# Patient Record
Sex: MALE | Race: WHITE
[De-identification: names, ages, dates, MRNs, and addresses within clinical notes are randomized per-mention and may not be internally consistent; named-entity substitution may affect disease eponyms.]

---

## 2020-05-13 ENCOUNTER — HOSPITAL ENCOUNTER (INPATIENT)
Dept: HOSPITAL 97 - ER | Age: 85
LOS: 9 days | Discharge: NURSING, REHAB OR LONG-TERM CARE | DRG: 291 | End: 2020-05-22
Attending: FAMILY MEDICINE | Admitting: INTERNAL MEDICINE
Payer: COMMERCIAL

## 2020-05-13 VITALS — BODY MASS INDEX: 36.2 KG/M2

## 2020-05-13 DIAGNOSIS — F17.210: ICD-10-CM

## 2020-05-13 DIAGNOSIS — Z79.82: ICD-10-CM

## 2020-05-13 DIAGNOSIS — Z79.899: ICD-10-CM

## 2020-05-13 DIAGNOSIS — I13.0: Primary | ICD-10-CM

## 2020-05-13 DIAGNOSIS — E11.40: ICD-10-CM

## 2020-05-13 DIAGNOSIS — I50.23: ICD-10-CM

## 2020-05-13 DIAGNOSIS — D63.8: ICD-10-CM

## 2020-05-13 DIAGNOSIS — I89.0: ICD-10-CM

## 2020-05-13 DIAGNOSIS — I35.0: ICD-10-CM

## 2020-05-13 DIAGNOSIS — Z90.49: ICD-10-CM

## 2020-05-13 DIAGNOSIS — E83.51: ICD-10-CM

## 2020-05-13 DIAGNOSIS — L89.303: ICD-10-CM

## 2020-05-13 DIAGNOSIS — I49.3: ICD-10-CM

## 2020-05-13 DIAGNOSIS — Z20.828: ICD-10-CM

## 2020-05-13 DIAGNOSIS — I25.10: ICD-10-CM

## 2020-05-13 DIAGNOSIS — Z86.73: ICD-10-CM

## 2020-05-13 DIAGNOSIS — K21.9: ICD-10-CM

## 2020-05-13 DIAGNOSIS — E11.22: ICD-10-CM

## 2020-05-13 DIAGNOSIS — E66.01: ICD-10-CM

## 2020-05-13 DIAGNOSIS — L89.302: ICD-10-CM

## 2020-05-13 DIAGNOSIS — Z88.5: ICD-10-CM

## 2020-05-13 DIAGNOSIS — N28.9: ICD-10-CM

## 2020-05-13 DIAGNOSIS — N18.4: ICD-10-CM

## 2020-05-13 DIAGNOSIS — N17.0: ICD-10-CM

## 2020-05-13 DIAGNOSIS — E43: ICD-10-CM

## 2020-05-13 DIAGNOSIS — R06.02: ICD-10-CM

## 2020-05-13 DIAGNOSIS — L03.116: ICD-10-CM

## 2020-05-13 DIAGNOSIS — I16.1: ICD-10-CM

## 2020-05-13 DIAGNOSIS — I48.0: ICD-10-CM

## 2020-05-13 DIAGNOSIS — E88.09: ICD-10-CM

## 2020-05-13 LAB
ALBUMIN SERPL BCP-MCNC: 3.2 G/DL (ref 3.4–5)
ALP SERPL-CCNC: 95 U/L (ref 45–117)
ALT SERPL W P-5'-P-CCNC: 14 U/L (ref 12–78)
AST SERPL W P-5'-P-CCNC: 12 U/L (ref 15–37)
BUN BLD-MCNC: 29 MG/DL (ref 7–18)
GLUCOSE SERPLBLD-MCNC: 124 MG/DL (ref 74–106)
HCT VFR BLD CALC: 30 % (ref 39.6–49)
INR BLD: 1.1
LYMPHOCYTES # SPEC AUTO: 0.8 K/UL (ref 0.7–4.9)
MAGNESIUM SERPL-MCNC: 1.9 MG/DL (ref 1.8–2.4)
NT-PROBNP SERPL-MCNC: 9136 PG/ML (ref ?–450)
PMV BLD: 8.5 FL (ref 7.6–11.3)
POTASSIUM SERPL-SCNC: 4.5 MMOL/L (ref 3.5–5.1)
RBC # BLD: 3.43 M/UL (ref 4.33–5.43)
TROPONIN (EMERG DEPT USE ONLY): < 0.02 NG/ML (ref 0–0.04)

## 2020-05-13 PROCEDURE — 85025 COMPLETE CBC W/AUTO DIFF WBC: CPT

## 2020-05-13 PROCEDURE — 80076 HEPATIC FUNCTION PANEL: CPT

## 2020-05-13 PROCEDURE — 97161 PT EVAL LOW COMPLEX 20 MIN: CPT

## 2020-05-13 PROCEDURE — 83036 HEMOGLOBIN GLYCOSYLATED A1C: CPT

## 2020-05-13 PROCEDURE — 84100 ASSAY OF PHOSPHORUS: CPT

## 2020-05-13 PROCEDURE — 36415 COLL VENOUS BLD VENIPUNCTURE: CPT

## 2020-05-13 PROCEDURE — 97530 THERAPEUTIC ACTIVITIES: CPT

## 2020-05-13 PROCEDURE — 76770 US EXAM ABDO BACK WALL COMP: CPT

## 2020-05-13 PROCEDURE — 99251: CPT

## 2020-05-13 PROCEDURE — 84484 ASSAY OF TROPONIN QUANT: CPT

## 2020-05-13 PROCEDURE — 99285 EMERGENCY DEPT VISIT HI MDM: CPT

## 2020-05-13 PROCEDURE — 93005 ELECTROCARDIOGRAM TRACING: CPT

## 2020-05-13 PROCEDURE — 86021 WBC ANTIBODY IDENTIFICATION: CPT

## 2020-05-13 PROCEDURE — 84156 ASSAY OF PROTEIN URINE: CPT

## 2020-05-13 PROCEDURE — 97112 NEUROMUSCULAR REEDUCATION: CPT

## 2020-05-13 PROCEDURE — 96374 THER/PROPH/DIAG INJ IV PUSH: CPT

## 2020-05-13 PROCEDURE — 81003 URINALYSIS AUTO W/O SCOPE: CPT

## 2020-05-13 PROCEDURE — 85652 RBC SED RATE AUTOMATED: CPT

## 2020-05-13 PROCEDURE — 97116 GAIT TRAINING THERAPY: CPT

## 2020-05-13 PROCEDURE — 86334 IMMUNOFIX E-PHORESIS SERUM: CPT

## 2020-05-13 PROCEDURE — 82570 ASSAY OF URINE CREATININE: CPT

## 2020-05-13 PROCEDURE — 86140 C-REACTIVE PROTEIN: CPT

## 2020-05-13 PROCEDURE — 93306 TTE W/DOPPLER COMPLETE: CPT

## 2020-05-13 PROCEDURE — 86038 ANTINUCLEAR ANTIBODIES: CPT

## 2020-05-13 PROCEDURE — 84145 PROCALCITONIN (PCT): CPT

## 2020-05-13 PROCEDURE — 71045 X-RAY EXAM CHEST 1 VIEW: CPT

## 2020-05-13 PROCEDURE — 80048 BASIC METABOLIC PNL TOTAL CA: CPT

## 2020-05-13 PROCEDURE — 93970 EXTREMITY STUDY: CPT

## 2020-05-13 PROCEDURE — 83880 ASSAY OF NATRIURETIC PEPTIDE: CPT

## 2020-05-13 PROCEDURE — 83735 ASSAY OF MAGNESIUM: CPT

## 2020-05-13 PROCEDURE — 86225 DNA ANTIBODY NATIVE: CPT

## 2020-05-13 PROCEDURE — 85610 PROTHROMBIN TIME: CPT

## 2020-05-13 PROCEDURE — 81001 URINALYSIS AUTO W/SCOPE: CPT

## 2020-05-13 PROCEDURE — 80053 COMPREHEN METABOLIC PANEL: CPT

## 2020-05-13 PROCEDURE — 76857 US EXAM PELVIC LIMITED: CPT

## 2020-05-13 RX ADMIN — HYDRALAZINE HYDROCHLORIDE SCH MG: 25 TABLET, FILM COATED ORAL at 19:22

## 2020-05-13 RX ADMIN — HYDRALAZINE HYDROCHLORIDE SCH MG: 25 TABLET, FILM COATED ORAL at 16:45

## 2020-05-13 NOTE — RAD REPORT
EXAM DESCRIPTION:  Starr Single View5/13/2020 8:51 am

 

CLINICAL HISTORY:  Shortness of breath

 

COMPARISON:  none

 

FINDINGS:  Small to moderate left and small right pleural effusions

 

Mild bilateral pulmonary opacities

 

The heart is mildly enlarged

 

IMPRESSION:  CHF

## 2020-05-13 NOTE — RAD REPORT
EXAM DESCRIPTION:  USExtrem Venous W Compress Bil5/13/2020 9:28 am

 

CLINICAL HISTORY:  Bilateral leg swelling

 

COMPARISON:  none

 

FINDINGS:  The common femoral, superficial femoral, popliteal and posterior tibial veins bilaterally 
are compressible and demonstrate augmentation.

 

Doppler demonstrates good flow.

 

IMPRESSION:  No evidence of deep venous thrombosis involving  either lower extremity.

## 2020-05-13 NOTE — EDPHYS
Physician Documentation                                                                           

 [facility]                                                                 

Name: Josh Barber                                                                              

Age: 86 yrs                                                                                       

Sex: Male                                                                                         

: 1933                                                                                   

MRN: [id number]                                                                                   

Arrival Date: 2020                                                                          

Time: 08:18                                                                                       

Account#: [id number]                                                                            

Bed 3                                                                                             

Private MD:                                                                                       

ED Physician Devika Harper                                                                    

HPI:                                                                                              

                                                                                             

09:01 This 86 yrs old  Male presents to ER via EMS with complaints of Leg Swelling.  jr8 

09:01 Patient stated that he had sudden increase in lower extremity swelling. Has had open    jr8 

      wounds to buttock and lower leg that were treated with Abx a couple of weeks ago. Now       

      due to the swelling lower leg wound is getting red and worse again. Also complains of       

      increase in shortness of breath . Severity of symptoms: At their worst the symptoms         

      were moderate. The patient has not experienced similar symptoms in the past. The            

      patient has not recently seen a physician.                                                  

                                                                                                  

Historical:                                                                                       

- Allergies:                                                                                      

08:27 Morphine;                                                                               ss  

- PMHx:                                                                                           

08:27 Atrial Fib; CHF;                                                                        ss  

                                                                                                  

- Social history:: Smoking status: Patient reports the use of cigarette tobacco                   

  products, cigars, "i sit on the balcony all day and smoke cigars", Patient uses.                

                                                                                                  

                                                                                                  

ROS:                                                                                              

09:01 Eyes: Negative for injury, pain, redness, and discharge, ENT: Negative for injury,      jr8 

      pain, and discharge, Neck: Negative for injury, pain, and swelling, Abdomen/GI:             

      Negative for abdominal pain, nausea, vomiting, diarrhea, and constipation, Back:            

      Negative for injury and pain, MS/Extremity: Negative for injury and deformity, Neuro:       

      Negative for headache, weakness, numbness, tingling, and seizure.                           

09:01 Cardiovascular: Positive for edema.                                                         

09:01 Respiratory: Positive for shortness of breath.                                              

09:01 Skin: Positive for open wounds left leg and buttock .                                       

                                                                                                  

Exam:                                                                                             

09:01 Eyes:  Pupils equal round and reactive to light, extra-ocular motions intact.  Lids and jr8 

      lashes normal.  Conjunctiva and sclera are non-icteric and not injected.  Cornea within     

      normal limits.  Periorbital areas with no swelling, redness, or edema. ENT:  Nares          

      patent. No nasal discharge, no septal abnormalities noted.  Tympanic membranes are          

      normal and external auditory canals are clear.  Oropharynx with no redness, swelling,       

      or masses, exudates, or evidence of obstruction, uvula midline.  Mucous membranes           

      moist.                                                                                      

09:01 Respiratory:  Lungs have equal breath sounds bilaterally, clear to auscultation and         

      percussion.  No rales, rhonchi or wheezes noted.  No increased work of breathing, no        

      retractions or nasal flaring. Abdomen/GI:  Soft, non-tender, with normal bowel sounds.      

      No distension or tympany.  No guarding or rebound.  No evidence of tenderness               

      throughout. Back:  No spinal tenderness.  No costovertebral tenderness.  Full range of      

      motion. MS/ Extremity:  Pulses equal, no cyanosis.  Neurovascular intact.  Full, normal     

      range of motion. Neuro:  Awake and alert, GCS 15, oriented to person, place, time, and      

      situation.  Cranial nerves II-XII grossly intact.  Motor strength 5/5 in all                

      extremities.  Sensory grossly intact.  Cerebellar exam normal.  Normal gait.                

09:01 Cardiovascular: Rate: normal, Rhythm: irregularly irregular, Pulses: Pulses are 2+ in       

      right radial artery and left radial artery. Heart sounds: murmur, systolic, grade  3        

      over 6, heard in the aortic area, Edema: 4+ edema to level of left knee, left midcalf,      

      left ankle, left foot, right knee, right midcalf, right ankle and right foot.               

09:01 ECG was reviewed by the Attending Physician.                                                

09:01 Skin: Patient has open wound with eschar noted to left anterior tibia region. Mild          

      surrounding erythema noted.                                                                 

                                                                                                  

Vital Signs:                                                                                      

08:23  / 81; Pulse 83; Resp 19; Temp 98.4(O); Pulse Ox 99% on R/A; Weight 157.85 kg     tw2 

      (R); Height 6 ft. 3 in. (190.50 cm); Pain 0/10;                                             

08:24  / 87; Pulse 112; Resp 19; Pulse Ox 95% on R/A; Weight 127.01 kg; Height 6 ft. 3  ss  

      in. (190.50 cm); Pain 0/10;                                                                 

10:00  / 86; Pulse 80; Resp 17; Pulse Ox 96% on R/A;                                    hb  

11:00  / 91; Pulse 68; Resp 19; Pulse Ox 98% on R/A;                                    hb  

12:00  / 81; Pulse 61; Resp 19; Pulse Ox 96% on R/A;                                    tw2 

12:56  / 91; Pulse 65; Resp 18; Pulse Ox 98% on R/A;                                    tw2 

14:00  / 77; Pulse 69; Resp 17; Pulse Ox 97% on R/A;                                    tw2 

15:01  / 84; Pulse 84; Resp 17; Pulse Ox 98% on R/A;                                    tw2 

16:00  / 85; Pulse 76; Resp 18; Pulse Ox 97% on R/A;                                    tw2 

16:52  / 83; Pulse 66; Resp 17; Pulse Ox 95% on R/A;                                    tw2 

08:24 Body Mass Index 35.00 (127.01 kg, 190.50 cm)                                            ss  

                                                                                                  

MDM:                                                                                              

08:27 Patient medically screened.                                                             jr8 

09:57 Data reviewed: vital signs, nurses notes, lab test result(s), EKG, radiologic studies,  jr8 

      plain films, ultrasound. Data interpreted: Pulse oximetry: on room air is 95 %.             

      Interpretation: normal. Counseling: I had a detailed discussion with the patient and/or     

      guardian regarding: the historical points, exam findings, and any diagnostic results        

      supporting the discharge/admit diagnosis, lab results, radiology results, the need for      

      further work-up and treatment in the hospital. Physician consultation: Prince Aubrey BURGOS was called at 09:58, was contacted at 09:58, regarding admission, to the telemetry       

      unit. consult, patient's condition.                                                         

                                                                                                  

                                                                                             

08:26 Order name: Basic Metabolic Panel; Complete Time: 09:51                                                                                                                              

08:26 Order name: CBC with Diff; Complete Time: 09:35                                                                                                                                      

08:26 Order name: LFT's; Complete Time: :51                                                                                                                                              

08:26 Order name: Magnesium; Complete Time: 09:51                                                                                                                                          

08:26 Order name: NT PRO-BNP; Complete Time: 09:51                                                                                                                                         

08:26 Order name: PT-INR; Complete Time: 09:41                                                                                                                                             

08:26 Order name: Troponin (emerg Dept Use Only); Complete Time: 09:51                                                                                                                     

08:26 Order name: XRAY Chest (1 view); Complete Time: 09:15                                                                                                                                

08:26 Order name: EKG; Complete Time: 08:28                                                                                                                                                

08:27 Order name: US Extremity Venous W Compression [place]; Complete Time: 09:41                 8 

                                                                                             

10:13 Order name: Urine Dipstick--Ancillary (enter results); Complete Time: 10:50             bd  

                                                                                             

08:26 Order name: Cardiac monitoring; Complete Time: 09:14                                                                                                                                 

08:26 Order name: EKG - Nurse/Tech; Complete Time: 09:14                                                                                                                                   

08:26 Order name: IV Saline Lock; Complete Time: 09:15                                                                                                                                     

08:26 Order name: Labs collected and sent; Complete Time: 09:15                                                                                                                            

08:26 Order name: O2 Per Protocol; Complete Time: 09:15                                                                                                                                    

08:26 Order name: O2 Sat Monitoring; Complete Time: 09:15                                                                                                                                  

13:58 Order name: Diet 2 Gm Sodium; Complete Time: 13:59                                      mh5 

                                                                                             

16:33 Order name: Labs - recollect needed: please collect mag; Complete Time: 16:56           bd  

                                                                                                  

EC:01 Rate is 74 beats/min. Rhythm is irregularly irregular, A fib with Couplets. Left axis   jr8 

      deviation noted. QRS interval is normal at 84 msec. QT interval is normal at 455 msec.      

      Q waves are Present. T waves are Normal. No ST changes noted. Clinical impression:          

      Atrial Fibrillation and No evidence of ischemia. Interpreted by me. Reviewed by me.         

                                                                                                  

Administered Medications:                                                                         

09:56 Not Given (Duplicate Order): Lasix 80 mg IVP once                                       tw2 

10:11 Drug: Lasix 60 mg Route: IVP; Site: left antecubital;                                   tw2 

15:02 Follow up: Response: No adverse reaction                                                tw2 

                                                                                                  

                                                                                                  

Disposition:                                                                                      

20 10:00 Hospitalization ordered by Prince Aubrey for Inpatient Admission. Preliminary    

  diagnosis are Acute kidney failure, Acute combined systolic (congestive) and                    

  diastolic (congestive) heart failure, Anasarca, Open wound of lower leg.                        

- Bed requested for Intensive Care Unit.                                                          

- Status is Inpatient Admission.                                                              tw2 

- Condition is Stable.                                                                            

- Problem is new.                                                                                 

- Symptoms have improved.                                                                         

                                                                                                  

                                                                                                  

                                                                                                  

Addendum:                                                                                         

05/15/2020                                                                                        

     18:20 Co-signature as Attending Physician, Devika pitts
a2

                                                                                                  

Signatures:                                                                                       

Dispatcher [employer]                           Amber Don Kimberly, RN                     RN   Rimma Tobin RN                      RN   ss                                                   

Robert Gonzalez PA                        PA   jr8                                                  

Lali Darnell RN                          RN   tw2                                                  

Devika Harper MD MD   ma2                                                  

                                                                                                  

Corrections: (The following items were deleted from the chart)                                    

                                                                                             

11:51 11:36 EC Echo Doppler W/Color Flow+[id number].RAD.BRZ ordered. EDMS                           EDMS

12:19 10:00 Hospitalization Ordered by Prince Aubrey BURGOS for Inpatient Admission. Preliminary kl  

      diagnosis is Acute kidney failure; Acute combined systolic (congestive) and diastolic       

      (congestive) heart failure; Anasarca; Open wound of lower leg. Bed requested for            

      Telemetry/MedSurg (observation). Status is Inpatient Admission. Condition is Stable.        

      Problem is new. Symptoms have improved. 8                                                 

13:13 12:19 2020 10:00 Hospitalization Ordered by Prince Aubrey BURGOS for Inpatient       kl  

      Admission. Preliminary diagnosis is Acute kidney failure; Acute combined systolic           

      (congestive) and diastolic (congestive) heart failure; Anasarca; Open wound of lower        

      leg. Bed requested for Telemetry/MedSurg (observation). Status is Inpatient Admission.      

      Condition is Stable. Problem is new. Symptoms have improved.                              

13:15 13:13 2020 10:00 Hospitalization Ordered by Prince Aubrey BURGOS for Inpatient       jr8 

      Admission. Preliminary diagnosis is Acute kidney failure; Acute combined systolic           

      (congestive) and diastolic (congestive) heart failure; Anasarca; Open wound of lower        

      leg. Bed requested for Telemetry/MedSurg (observation). Status is Inpatient Admission.      

      Condition is Stable. Problem is new. Symptoms have improved.                              

16:52 13:15 2020 10:00 Hospitalization Ordered by Prince Aubrey BURGOS for Inpatient       ss  

      Admission. Preliminary diagnosis is Acute kidney failure; Acute combined systolic           

      (congestive) and diastolic (congestive) heart failure; Anasarca; Open wound of lower        

      leg. Bed requested for Intensive Care Unit. Status is Inpatient Admission. Condition is     

      Stable. Problem is new. Symptoms have improved. 8                                         

17:19 16:52 2020 10:00 Hospitalization Ordered by Prince Aubrey BURGOS for Inpatient       tw2 

      Admission. Preliminary diagnosis is Acute kidney failure; Acute combined systolic           

      (congestive) and diastolic (congestive) heart failure; Anasarca; Open wound of lower        

      leg. Bed requested for Intensive Care Unit. Status is Inpatient Admission. Condition is     

      Stable. Problem is new. Symptoms have improved. ss                                          

                                                                                                  

**************************************************************************************************
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
## 2020-05-13 NOTE — EKG
Test Date:    2020-05-13               Test Time:    08:38:16

Technician:   JORGE                                     

                                                     

MEASUREMENT RESULTS:                                       

Intervals:                                           

Rate:         74                                     

SC:                                                  

QRSD:         84                                     

QT:           410                                    

QTc:          455                                    

Axis:                                                

P:                                                   

SC:                                                  

QRS:          -53                                    

T:            99                                     

                                                     

INTERPRETIVE STATEMENTS:                                       

                                                     

Atrial fibrillation with premature ventricular or aberrantly conducted

complexes

Left axis deviation

Minimal voltage criteria for LVH, may be normal variant

Inferior infarct, age undetermined

Anteroseptal infarct, age undetermined

Abnormal ECG

No previous ECG available for comparison



Electronically Signed On 05-13-20 20:13:13 CDT by Ralf Espinal
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
## 2020-05-14 LAB
ALBUMIN SERPL BCP-MCNC: 2.6 G/DL (ref 3.4–5)
ALP SERPL-CCNC: 78 U/L (ref 45–117)
ALT SERPL W P-5'-P-CCNC: 12 U/L (ref 12–78)
AST SERPL W P-5'-P-CCNC: 10 U/L (ref 15–37)
BUN BLD-MCNC: 28 MG/DL (ref 7–18)
GLUCOSE SERPLBLD-MCNC: 129 MG/DL (ref 74–106)
MAGNESIUM SERPL-MCNC: 1.8 MG/DL (ref 1.8–2.4)
POTASSIUM SERPL-SCNC: 4.5 MMOL/L (ref 3.5–5.1)

## 2020-05-14 RX ADMIN — SUCRALFATE SCH GM: 1 TABLET ORAL at 21:59

## 2020-05-14 RX ADMIN — SUCRALFATE SCH GM: 1 TABLET ORAL at 13:01

## 2020-05-14 RX ADMIN — ISOSORBIDE DINITRATE SCH MG: 20 TABLET ORAL at 21:58

## 2020-05-14 RX ADMIN — ASPIRIN 81 MG SCH MG: 81 TABLET ORAL at 08:08

## 2020-05-14 RX ADMIN — ISOSORBIDE DINITRATE SCH: 20 TABLET ORAL at 12:59

## 2020-05-14 RX ADMIN — Medication SCH ML: at 22:01

## 2020-05-14 RX ADMIN — Medication SCH PKT: at 22:01

## 2020-05-14 RX ADMIN — GABAPENTIN SCH MG: 100 CAPSULE ORAL at 08:08

## 2020-05-14 RX ADMIN — MAGNESIUM OXIDE TAB 400 MG (241.3 MG ELEMENTAL MG) SCH MG: 400 (241.3 MG) TAB at 21:00

## 2020-05-14 RX ADMIN — ALBUMIN (HUMAN) SCH: 5 SOLUTION INTRAVENOUS at 08:10

## 2020-05-14 RX ADMIN — ALBUMIN (HUMAN) SCH MG: 5 SOLUTION INTRAVENOUS at 08:08

## 2020-05-14 RX ADMIN — MAGNESIUM OXIDE TAB 400 MG (241.3 MG ELEMENTAL MG) SCH MG: 400 (241.3 MG) TAB at 08:09

## 2020-05-14 RX ADMIN — MAGNESIUM OXIDE TAB 400 MG (241.3 MG ELEMENTAL MG) SCH MG: 400 (241.3 MG) TAB at 13:02

## 2020-05-14 RX ADMIN — Medication SCH APPL: at 11:38

## 2020-05-14 RX ADMIN — ISOSORBIDE DINITRATE SCH MG: 20 TABLET ORAL at 11:38

## 2020-05-14 RX ADMIN — ALBUMIN (HUMAN) SCH MG: 5 SOLUTION INTRAVENOUS at 16:09

## 2020-05-14 RX ADMIN — TAMSULOSIN HYDROCHLORIDE SCH MG: 0.4 CAPSULE ORAL at 22:00

## 2020-05-14 RX ADMIN — HYDRALAZINE HYDROCHLORIDE SCH MG: 25 TABLET, FILM COATED ORAL at 13:01

## 2020-05-14 RX ADMIN — GABAPENTIN SCH MG: 100 CAPSULE ORAL at 13:01

## 2020-05-14 RX ADMIN — SUCRALFATE SCH GM: 1 TABLET ORAL at 08:08

## 2020-05-14 RX ADMIN — SUCRALFATE SCH GM: 1 TABLET ORAL at 16:11

## 2020-05-14 RX ADMIN — GABAPENTIN SCH MG: 100 CAPSULE ORAL at 21:59

## 2020-05-14 RX ADMIN — HYDRALAZINE HYDROCHLORIDE SCH MG: 25 TABLET, FILM COATED ORAL at 08:08

## 2020-05-14 RX ADMIN — HYDRALAZINE HYDROCHLORIDE SCH MG: 25 TABLET, FILM COATED ORAL at 22:00

## 2020-05-14 RX ADMIN — SENNOSIDES AND DOCUSATE SODIUM SCH TAB: 8.6; 5 TABLET ORAL at 22:00

## 2020-05-14 RX ADMIN — PANTOPRAZOLE SODIUM SCH MG: 40 TABLET, DELAYED RELEASE ORAL at 08:08

## 2020-05-14 NOTE — ECHO
HEIGHT: 6 ft 3 in   WEIGHT: 289 lb 12.8 oz   DATE OF STUDY: 5/14/2020   REFER DR: 
Prince ELI Burgos MD

2-DIMENSIONAL: YES

     M.MODE: YES

 DOPPLER: YES

COLOR FLOW: YES



                    TDS:  NO

PORTABLE: NO

 DEFINITY:  NO

BUBBLE STUDY: NO





DIAGNOSIS:  CONGESTIVE HEART FAILURE



CARDIAC HISTORY:  

CATHERIZATION: YES

SURGERY: NO

PROSTHETIC VALVE: NO

PACEMAKER: NO





MEASUREMENTS (cm)

    DIASTOLIC (NORMALS)                 SYSTOLIC (NORMALS)

IVSd                 1.3 (0.6-1.2)                    LA Diam 4.1 (1.9-4.0)     LVEF       
  44%  

LVIDd               5.6 (3.5-5.7)                        LVIDs      4.4 (2.0-3.5)     %FS  
        22%

LVPWd             1.2 (0.6-1.2)

Ao Diam           3.2 (2.0-3.7)



2 DIMENSIONAL ASSESSMENT:

RIGHT ATRIUM:                   NORMAL

LEFT ATRIUM:       DILATED



RIGHT VENTRICLE:            NORMAL

LEFT VENTRICLE: NORMAL SIZE 



TRICUSPID VALVE:             NORMAL

MITRAL VALVE:     MITRAL ANNULAR CALCIFICATION



PULMONIC VALVE:             NORMAL

AORTIC VALVE:     STENOTIC 



PERICARDIAL EFFUSION: NONE

AORTIC ROOT:      NORMAL





LEFT VENTRICULAR WALL MOTION:     MILD GLOBAL HYPOKINESIS.



DOPPLER/COLOR FLOW:     MILD AORTIC STENOSIS - 1.7 CENTIMETER SQUARED. MILD TRICUSPID 
REGURGITATION - NORMAL RIGHT VENTRICULAR SYSTOLIC PRESSURE.



COMMENTS:      MILD AORTIC STENOSIS 1.7 CENTIMETERS SQUARED. MILD GLOBAL HYPOKINESIS - 
EJECTION FRACTION 44%. MITRAL ANNULAR CALCIFICATION. MILD TRICUSPID REGURGITATION - NORMAL 
RIGHT VENTRICULAR SYSTOLIC PRESSURE.



TECHNOLOGIST:   STIVEN ROLLINS
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
## 2020-05-14 NOTE — P.PN
Subjective


Date of Service: 05/14/20


Chief Complaint: shortness of breath and lower extremity edema


Subjective: Improving, Other (Patient reports symptomatic improvement of lower 

extremity edema and pressure ulcers on the buttocks.  Hypertensive crisis has 

resolved. He will be downgraded to general floor while on telemetry.)





Physical Examination





- Vital Signs


Temperature: 98.3 F


Blood Pressure: 167/70


Pulse: 78


Respirations: 13


Pulse Ox (%): 98





- Physical Exam


General: Alert, In no apparent distress, Cooperative


HEENT: Atraumatic, Normocephalic, EOMI


Neck: Supple


Respiratory: Normal air movement


Cardiovascular: Regular rate/rhythm, Normal S1 S2, Irregular heart rate/rhythm


Gastrointestinal: Normal bowel sounds, Soft and benign, Non-distended


Musculoskeletal: No clubbing, No contractures, No warmth, Swelling, Erythema, 

Tenderness


Integumentary: Skin breakdown, Pressure ulcer


Neurological: Normal speech, Sensation intact, Normal affect





Assessment & Plan





- Problems (Diagnosis)


(1) Hypertensive emergency


Current Visit: Yes   Status: Acute   





(2) Acute decompensated heart failure


Current Visit: Yes   Status: Acute   





(3) CAROLE (acute kidney injury)


Current Visit: Yes   Status: Acute   





(4) Stage II pressure ulcer of buttock


Current Visit: Yes   Status: Acute   





(5) Left leg cellulitis


Current Visit: Yes   Status: Acute   





(6) Atrial fibrillation


Current Visit: Yes   Status: Acute   





(7) Hypertension


Current Visit: Yes   Status: Acute   


Physician Review Additional Text: 





Assessment


Patient is a 86 year old  male wtih morbid obesity, HTN, atrial fibri

llation who presented to the ER with worsening bilateral lower extremity edema 

and wounds and mild shortness of breath. He was foudn to have hypertensive 

emergency with SBP > 195 mmHg.  He required ICU placement for nitroglycerin 

infusion and lasix injections





Hypertensive emergency


Decompensated CHF


Atrial fibrillation


CAROLE


Leg wounds/cellulitis


Stage III pressure ulcer in the buttocks





PLAN


Downgrade to floor while on telemetry


Continue diuresis, monitor I/O daily


Continue Hydralazine, add nitrates 


Follow up 2-D ECHO


Coreg for atrial fibrillation


Will discuss systemic anticoagulation for CVA prevention


Follow up nephrology recommendations for CAROLE. 


Continue wound care for as per Wound nurse instructions


Case discussed during MDR for home health with wound care

## 2020-05-15 LAB
BUN BLD-MCNC: 35 MG/DL (ref 7–18)
GLUCOSE SERPLBLD-MCNC: 104 MG/DL (ref 74–106)
POTASSIUM SERPL-SCNC: 4.1 MMOL/L (ref 3.5–5.1)

## 2020-05-15 RX ADMIN — SUCRALFATE SCH GM: 1 TABLET ORAL at 13:18

## 2020-05-15 RX ADMIN — GABAPENTIN SCH MG: 100 CAPSULE ORAL at 21:14

## 2020-05-15 RX ADMIN — HYDRALAZINE HYDROCHLORIDE SCH MG: 25 TABLET, FILM COATED ORAL at 09:11

## 2020-05-15 RX ADMIN — ALBUMIN (HUMAN) SCH MG: 5 SOLUTION INTRAVENOUS at 16:51

## 2020-05-15 RX ADMIN — ISOSORBIDE DINITRATE SCH MG: 20 TABLET ORAL at 13:19

## 2020-05-15 RX ADMIN — GABAPENTIN SCH MG: 100 CAPSULE ORAL at 09:10

## 2020-05-15 RX ADMIN — Medication SCH ML: at 09:14

## 2020-05-15 RX ADMIN — ISOSORBIDE DINITRATE SCH MG: 20 TABLET ORAL at 09:10

## 2020-05-15 RX ADMIN — TAMSULOSIN HYDROCHLORIDE SCH MG: 0.4 CAPSULE ORAL at 21:14

## 2020-05-15 RX ADMIN — Medication SCH ML: at 21:15

## 2020-05-15 RX ADMIN — CHOLECALCIFEROL CAP 125 MCG (5000 UNIT) SCH UNIT: 125 CAP at 09:10

## 2020-05-15 RX ADMIN — MAGNESIUM OXIDE TAB 400 MG (241.3 MG ELEMENTAL MG) SCH MG: 400 (241.3 MG) TAB at 09:00

## 2020-05-15 RX ADMIN — SUCRALFATE SCH GM: 1 TABLET ORAL at 16:51

## 2020-05-15 RX ADMIN — DABIGATRAN ETEXILATE MESYLATE SCH MG: 150 CAPSULE ORAL at 13:27

## 2020-05-15 RX ADMIN — SENNOSIDES AND DOCUSATE SODIUM SCH TAB: 8.6; 5 TABLET ORAL at 21:14

## 2020-05-15 RX ADMIN — DABIGATRAN ETEXILATE MESYLATE SCH MG: 150 CAPSULE ORAL at 21:14

## 2020-05-15 RX ADMIN — Medication SCH PKT: at 09:12

## 2020-05-15 RX ADMIN — PANTOPRAZOLE SODIUM SCH MG: 40 TABLET, DELAYED RELEASE ORAL at 09:10

## 2020-05-15 RX ADMIN — MAGNESIUM OXIDE TAB 400 MG (241.3 MG ELEMENTAL MG) SCH MG: 400 (241.3 MG) TAB at 21:15

## 2020-05-15 RX ADMIN — HYDRALAZINE HYDROCHLORIDE SCH MG: 25 TABLET, FILM COATED ORAL at 21:14

## 2020-05-15 RX ADMIN — LOSARTAN POTASSIUM SCH: 50 TABLET, FILM COATED ORAL at 22:00

## 2020-05-15 RX ADMIN — ASPIRIN 81 MG SCH MG: 81 TABLET ORAL at 09:10

## 2020-05-15 RX ADMIN — ALBUMIN (HUMAN) SCH MG: 5 SOLUTION INTRAVENOUS at 09:11

## 2020-05-15 RX ADMIN — HYDRALAZINE HYDROCHLORIDE SCH MG: 25 TABLET, FILM COATED ORAL at 13:18

## 2020-05-15 RX ADMIN — MAGNESIUM OXIDE TAB 400 MG (241.3 MG ELEMENTAL MG) SCH MG: 400 (241.3 MG) TAB at 13:19

## 2020-05-15 RX ADMIN — Medication SCH APPL: at 14:14

## 2020-05-15 RX ADMIN — SUCRALFATE SCH GM: 1 TABLET ORAL at 21:14

## 2020-05-15 RX ADMIN — GABAPENTIN SCH MG: 100 CAPSULE ORAL at 13:19

## 2020-05-15 RX ADMIN — ISOSORBIDE DINITRATE SCH MG: 20 TABLET ORAL at 21:14

## 2020-05-15 RX ADMIN — HYDRALAZINE HYDROCHLORIDE SCH: 25 TABLET, FILM COATED ORAL at 09:00

## 2020-05-15 RX ADMIN — Medication SCH PKT: at 21:15

## 2020-05-15 RX ADMIN — SUCRALFATE SCH GM: 1 TABLET ORAL at 09:11
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
## 2020-05-15 NOTE — P.PN
Subjective


Date of Service: 05/15/20


Chief Complaint: shortness of breath and lower extremity edema


Subjective: No new changes (Patient is reporting dyspnea with minimal exertion 

such as getting out of toilet.  However, he is overall doing better.  I spent 10

minutes discussing plan of care. I stressed on the importance of dietary 

compliance including daily total water intake and salt, medication compliance.  

I also discussed the importance of systemic anticoagulation for CVA prevention. 

He was initially adverse to the idea because of the amount of monitoring 

required while on warfarin.  I discussed alternative with him.  He is agreeable 

as long as there is reversal agent.)





Physical Examination





- Vital Signs


Temperature: 97.7 F


Blood Pressure: 136/63


Pulse: 58


Respirations: 19


Pulse Ox (%): 94





- Physical Exam


General: Cooperative, Moderate distress


HEENT: Atraumatic, Normocephalic, EOMI


Respiratory: Diminished, Crackles/rales


Cardiovascular: Normal S1 S2, Irregular heart rate/rhythm


Gastrointestinal: Normal bowel sounds, Soft and benign, Non-distended


Musculoskeletal: Swelling, Erythema, Tenderness


Integumentary: Pressure ulcer, Other (stage III pressure ulcer in the buttocks)


Neurological: Normal speech, Sensation intact, Normal affect





Assessment & Plan





- Problems (Diagnosis)


(1) Hypertensive emergency


Current Visit: Yes   Status: Acute   





(2) Acute decompensated heart failure


Current Visit: Yes   Status: Acute   





(3) CAROLE (acute kidney injury)


Current Visit: Yes   Status: Acute   





(4) Stage II pressure ulcer of buttock


Current Visit: Yes   Status: Acute   





(5) Left leg cellulitis


Current Visit: Yes   Status: Acute   





(6) Atrial fibrillation


Current Visit: Yes   Status: Acute   





(7) Hypertension


Current Visit: Yes   Status: Acute   


Physician Review Additional Text: 





Assessment


Patient is a 86 year old  male with morbid obesity, HTN, atrial 

fibrillation who presented to the ER with worsening bilateral lower extremity 

edema and wounds and mild shortness of breath. He was found to have hypertensive

emergency with SBP > 195 mmHg, acute on chronic systolic HF and CAROLE.  He 

required ICU placement for nitroglycerin infusion and lasix injections.  2-D 

Echo shows EF of 40-44%.  He is still requiring diuresis. His renal function is 

slowly to recover. He had a negative renal and bladder ultrasound. Nephrology is

on board. 





Hypertensive emergency


Decompensated CHF


Atrial fibrillation


CAROLE


Leg wounds/cellulitis


Stage III pressure ulcer in the buttocks





PLAN


Continue telemetry


Continue diuresis, monitor I/O daily. Received 1 dose of metolazone yesterday


Patient educated on fluid restriction, 1.5 L per day AND salt restriction


Continue Hydralazine and nitrates 


Coreg for atrial fibrillation 


Continue wound care for as per Wound nurse instructions


Case discussed during MDR for home health with wound care. I will put order for 

cardiac rehab.

## 2020-05-15 NOTE — P.PN
Date of Service: 05/15/20


Vital Signs











Temp Pulse Resp BP Pulse Ox


 


 98.1 F   53   18   172/65 H  97 


 


 05/15/20 20:00  05/15/20 20:00  05/15/20 20:00  05/15/20 20:00  05/15/20 20:00





Medications





Aspirin (Aspirin Chewable)  81 mg PO DAILY [facility]


   Stop: 06/13/20 09:01


   Last Admin: 05/15/20 09:10 Dose:  81 mg


   Documented by: 


Carvedilol (Coreg)  12.5 mg PO BID 6AM 6PM [facility]


   Stop: 06/13/20 10:57


   Last Admin: 05/15/20 17:04 Dose:  12.5 mg


   Documented by: 


Cholecalciferol (Vitamin D 5,000 Iu Cap)  5,000 unit PO DAILY [facility]


   Stop: 06/14/20 09:01


   Last Admin: 05/15/20 09:10 Dose:  5,000 unit


   Documented by: 


Dabigatran (Pradaxa)  150 mg PO BID [facility]


   Stop: 06/14/20 14:01


   Last Admin: 05/15/20 21:14 Dose:  150 mg


   Documented by: 


Emollient Gel (Medihoney Woundcare Gel)  1 appl TOP DAILY [facility]


   Stop: 06/13/20 09:01


   Last Admin: 05/15/20 14:14 Dose:  1 appl


   Documented by: 


Furosemide (Lasix)  40 mg IV BIDL [facility]


   Stop: 06/13/20 07:37


   Last Admin: 05/15/20 16:51 Dose:  40 mg


   Documented by: 


Gabapentin (Neurontin)  100 mg PO TID [facility]


   Stop: 06/13/20 09:01


   Last Admin: 05/15/20 21:14 Dose:  100 mg


   Documented by: 


Hydralazine HCl (Apresoline)  50 mg PO TID [facility]


   Stop: 06/14/20 08:01


   Last Admin: 05/15/20 21:14 Dose:  50 mg


   Documented by: 


Nitroglycerin/Dextrose (Ntg 0.2 Mg/Ml In D5w 250 Ml)  50 mg in 250 mls @ 0 

mls/hr IV PRN PRN; Protocol


   PRN Reason: Hemodynamic Parameters


   Stop: 06/12/20 17:26


   Last Admin: 05/13/20 17:53 Dose:  250 mls


   Documented by: 


Isosorbide Dinitrate (Isordil)  20 mg PO TID [facility]


   Stop: 06/13/20 10:55


   Last Admin: 05/15/20 21:14 Dose:  20 mg


   Documented by: 


L-Arginine/L-Glutamine/HMB (Carl)  1 pkt PO BID [facility]


   Stop: 06/13/20 21:01


   Last Admin: 05/15/20 21:15 Dose:  1 pkt


   Documented by: 


Magnesium Oxide (Mag 0x Tab)  400 mg PO TID [facility]


   Stop: 06/13/20 09:01


   Last Admin: 05/15/20 21:15 Dose:  400 mg


   Documented by: 


Nutritional Formula (Ensure High Protein)  237 ml PO BID [facility]


   Stop: 06/13/20 21:01


   Last Admin: 05/15/20 21:15 Dose:  237 ml


   Documented by: 


Pantoprazole Sodium (Protonix  Tab)  40 mg PO DAILY [facility]


   Stop: 06/13/20 09:01


   Last Admin: 05/15/20 09:10 Dose:  40 mg


   Documented by: 


Senna/Docusate Sodium (Senokot-S)  1 tab PO BEDTIME [facility]


   Stop: 06/13/20 21:01


   Last Admin: 05/15/20 21:14 Dose:  1 tab


   Documented by: 


Sucralfate (Carafate -Tab)  1 gm PO QID [facility]


   Stop: 06/13/20 09:01


   Last Admin: 05/15/20 21:14 Dose:  1 gm


   Documented by: 


Tamsulosin HCl (Flomax)  0.8 mg PO BEDTIME [facility]


   Stop: 06/13/20 21:01


   Last Admin: 05/15/20 21:14 Dose:  0.8 mg


   Documented by: 





Assessment/ Plan: 


Nephrology





Feeling better today.


CPS stable without CP or SOB.  +TORRES


No acute events overnight.





Vitals, medications, blood work and imaging reviewed in the chart.


General: In no apparent distress, Oriented x3, Cooperative


HEENT: Normocephalic, Mucous membr. moist/pink


Neck: Supple


Respiratory: Clear to auscultation bilaterally


Cardiovascular: Regular rate/rhythm, No rubs, Edema


Gastrointestinal: Soft and benign, Non-distended, No guarding


Musculoskeletal: No clubbing, No contractures


Integumentary: No cyanosis, Skin breakdown


Neurological: Normal speech





Laboratory Data (last 24 hrs)


05/13/20 09:10: PT 13.0 H, INR 1.10


05/13/20 09:10: WBC 5.5, Hgb 9.5 L, Hct 30.0 L, Plt Count 243


05/13/20 09:10: Sodium 138, Potassium 4.5, BUN 29 H, Creatinine 2.39 H, Glucose 

124 H, Magnesium 1.9, Total Bilirubin 0.5, AST 12 L, ALT 14, Alkaline Phosphata

se 95





Imagings Data: 


EXAM DESCRIPTION:  Starr Single View5/13/2020 8:51 am


CLINICAL HISTORY:  Shortness of breath


COMPARISON:  none


FINDINGS:  Small to moderate left and small right pleural effusion


Mild bilateral pulmonary opacities


The heart is mildly enlarged


IMPRESSION:  CHF





COMMENTS:      MILD AORTIC STENOSIS 1.7 CENTIMETERS SQUARED. MILD GLOBAL 

HYPOKINESIS  EJECTION FRACTION 44%. MITRAL ANNULAR CALCIFICATION. MILD TRICUSPID

REGURGITATION  NORMAL RIGHT VENTRICULAR SYSTOLIC PRESSURE.





Conclusions/Impression: 


A/


CAROLE in the setting of CHF.


Hypocalcemia.


CKD with proteinuria.  Baseline unknown.


HTN with CKD/ CHF.


Systolic CHF, A/C.


DM II with CKD.


Anemia in chronic illness.





P/ Continue current POC and Medications.


Continue Lasix.


Reduce Coreg due to bradycardia.


Restart Losartan 25mg BID and titrate as needed.


No NSAIDs.


AM labs.  Daily weight.


PCP records pending.

## 2020-05-15 NOTE — RAD REPORT
EXAM DESCRIPTION:  US - Urinary Bladder - 5/14/2020 9:48 pm

 

FINDINGS:  Dedicated bladder sonography was performed.

 

Urinary bladder volume is low somewhat limiting assessment. No bladder wall thickening or mass identi
fied. No stone or other intraluminal mass identifiable.

## 2020-05-15 NOTE — RAD REPORT
EXAM DESCRIPTION:  US - Renal Ultrasound-Complete - 5/14/2020 9:48 pm

 

CLINICAL HISTORY:  CAROLE

 

COMPARISON:  No comparisons

 

FINDINGS:  The right kidney measures 11.8 x 5.6 x 6.2 cm.  The left kidney measures 12.9 x 5.5 x 5.0 
cm. Renal cortical thickness and echogenicity are normal. No hydronephrosis or suspicious renal mass.


 

Doppler evaluation was technically difficult. There appears to be a diminished perfusion to both kidn
eys. The accuracy of diminished perfusion assessment is question. If warranted, dedicated renal Doppl
er assessment could be performed with the patient could undergo no CTA or MRA of the renal vasculatur
e.

 

No bladder wall thickening or mass. No intraluminal stone or mass.

 

 

IMPRESSION:  No hydronephrosis or suspicious renal mass.

 

Limited Doppler assessment suggest diminished perfusion a each kidneys. If clinically warranted, this
 could be further assessed as detailed above.

## 2020-05-16 LAB
BUN BLD-MCNC: 48 MG/DL (ref 7–18)
GLUCOSE SERPLBLD-MCNC: 136 MG/DL (ref 74–106)
HCT VFR BLD CALC: 26 % (ref 39.6–49)
LYMPHOCYTES # SPEC AUTO: 0.8 K/UL (ref 0.7–4.9)
PMV BLD: 8.9 FL (ref 7.6–11.3)
POTASSIUM SERPL-SCNC: 4.1 MMOL/L (ref 3.5–5.1)
RBC # BLD: 2.97 M/UL (ref 4.33–5.43)

## 2020-05-16 RX ADMIN — Medication SCH ML: at 20:58

## 2020-05-16 RX ADMIN — HYDRALAZINE HYDROCHLORIDE SCH MG: 25 TABLET, FILM COATED ORAL at 20:58

## 2020-05-16 RX ADMIN — Medication SCH PKT: at 20:58

## 2020-05-16 RX ADMIN — SENNOSIDES AND DOCUSATE SODIUM SCH TAB: 8.6; 5 TABLET ORAL at 20:58

## 2020-05-16 RX ADMIN — ISOSORBIDE DINITRATE SCH MG: 20 TABLET ORAL at 20:58

## 2020-05-16 RX ADMIN — MAGNESIUM OXIDE TAB 400 MG (241.3 MG ELEMENTAL MG) SCH MG: 400 (241.3 MG) TAB at 20:58

## 2020-05-16 RX ADMIN — Medication SCH PKT: at 09:06

## 2020-05-16 RX ADMIN — CHOLECALCIFEROL CAP 125 MCG (5000 UNIT) SCH UNIT: 125 CAP at 09:01

## 2020-05-16 RX ADMIN — HYDRALAZINE HYDROCHLORIDE SCH MG: 25 TABLET, FILM COATED ORAL at 09:01

## 2020-05-16 RX ADMIN — LOSARTAN POTASSIUM SCH MG: 50 TABLET, FILM COATED ORAL at 09:01

## 2020-05-16 RX ADMIN — ISOSORBIDE DINITRATE SCH MG: 20 TABLET ORAL at 09:02

## 2020-05-16 RX ADMIN — SUCRALFATE SCH GM: 1 TABLET ORAL at 20:58

## 2020-05-16 RX ADMIN — HYDRALAZINE HYDROCHLORIDE SCH MG: 25 TABLET, FILM COATED ORAL at 13:58

## 2020-05-16 RX ADMIN — SUCRALFATE SCH GM: 1 TABLET ORAL at 17:01

## 2020-05-16 RX ADMIN — MAGNESIUM OXIDE TAB 400 MG (241.3 MG ELEMENTAL MG) SCH MG: 400 (241.3 MG) TAB at 14:22

## 2020-05-16 RX ADMIN — SUCRALFATE SCH GM: 1 TABLET ORAL at 13:58

## 2020-05-16 RX ADMIN — DABIGATRAN ETEXILATE MESYLATE SCH MG: 75 CAPSULE ORAL at 09:06

## 2020-05-16 RX ADMIN — SUCRALFATE SCH GM: 1 TABLET ORAL at 09:01

## 2020-05-16 RX ADMIN — ASPIRIN 81 MG SCH: 81 TABLET ORAL at 09:00

## 2020-05-16 RX ADMIN — ALBUMIN (HUMAN) SCH MG: 5 SOLUTION INTRAVENOUS at 09:02

## 2020-05-16 RX ADMIN — GABAPENTIN SCH MG: 100 CAPSULE ORAL at 13:58

## 2020-05-16 RX ADMIN — ACETAMINOPHEN PRN MG: 500 TABLET, FILM COATED ORAL at 17:01

## 2020-05-16 RX ADMIN — ISOSORBIDE DINITRATE SCH MG: 20 TABLET ORAL at 13:58

## 2020-05-16 RX ADMIN — GABAPENTIN SCH MG: 100 CAPSULE ORAL at 09:02

## 2020-05-16 RX ADMIN — Medication SCH ML: at 09:06

## 2020-05-16 RX ADMIN — DABIGATRAN ETEXILATE MESYLATE SCH MG: 75 CAPSULE ORAL at 20:59

## 2020-05-16 RX ADMIN — GABAPENTIN SCH MG: 100 CAPSULE ORAL at 20:58

## 2020-05-16 RX ADMIN — ALBUMIN (HUMAN) SCH MG: 5 SOLUTION INTRAVENOUS at 17:00

## 2020-05-16 RX ADMIN — Medication SCH APPL: at 09:06

## 2020-05-16 RX ADMIN — MAGNESIUM OXIDE TAB 400 MG (241.3 MG ELEMENTAL MG) SCH MG: 400 (241.3 MG) TAB at 09:02

## 2020-05-16 RX ADMIN — PANTOPRAZOLE SODIUM SCH MG: 40 TABLET, DELAYED RELEASE ORAL at 09:02

## 2020-05-16 RX ADMIN — TAMSULOSIN HYDROCHLORIDE SCH MG: 0.4 CAPSULE ORAL at 20:59

## 2020-05-16 NOTE — PN
Date of Progress Note:  05/16/2020



Mr. Barber is there with acute on chronic systolic congestive heart failure, acute kidney injury.  E
jection fraction was 44% by echocardiogram.  Mr. Barber has paroxysmal atrial fibrillation.  He has 
had that in the past and has refused Coumadin therapy, but yesterday, he was convinced to take Pradax
a for his atrial fibrillation.  His atrial fibrillation is well controlled.  His heart rate is only 5
9.  His last blood pressure was 137/48.  He is saturating 100% on room air.  His last creatinine of 2
.46.  He has been seen by Nephrology.  Renal ultrasound suggested diminished perfusion of each kidney
, but no hydronephrosis or renal masses.  His latest recommendation from Nephrology was to continue h
is Lasix, Coreg was reduced secondary to bradycardia, losartan was restarted, a.m. labs were ordered.
  I agree with his present assessment and plan and treatment.  I will continue to follow him.





NB/SIDNEY

DD:  05/16/2020 17:22:51Voice ID:  [id number]

DT:  05/16/2020 20:57:57Report ID:  [id number]

## 2020-05-16 NOTE — P.PN
Date of Service: 05/16/20





patient seen/examined.


labs/meds reviewed.


patient still weak and bp on lower side at times.


creatinine slightly higher and now in 3 range. 


calcium is lower.


on multiple bp meds.





vs stable. low bp at times.





lungs decreased bs b/l


cvs irregular


abd soft/nt/nd/bs+


ext: impressive 2-3 + edema b/l





a/p:


asher?/CKD? stage 3/? cardiorenal/? 2nd to hx of htn/on multiple meds with bp 

lower at times/had been on betablocker and hydralazine in past but betablocker 

was d/neo while hydralazine continued. on higher dose of flomax. feels light 

headed and dizzy easily.





decrease losartan to 25mg daily


decrease gabapentin to 100mg qhs


decrease hydralazine to 25mg tid and hold for sbp <120


decrease flomax to 0.4 mg and hold for sbp <120


if bp improves, may consider increasing lasix in future and/or increasing 

losartan. if stabilizes on diuretics and ace/arb, may be able to cut back on 

hydralazine further and consider stopping. my stop gabapentin in future if able 

to tolerate stopping it.


likely contribution to swelling from gabapentin and hydralazine.


agree with betablocker. would avoid using hydralazine if betablocker is 

discontinued for any reason.





d/w hospitalist.

## 2020-05-16 NOTE — P.PN
Subjective


Date of Service: 05/16/20


Chief Complaint: shortness of breath and lower extremity edema


Subjective: New changes (Patient bradycardic this morning.  Coreg held.  

Otherwise, he is comfortable w/o supplemental O2 while at rest.  Still reporting

TORRES.)





Physical Examination





- Vital Signs


Temperature: 97.3 F


Blood Pressure: 133/65


Pulse: 54


Respirations: 20


Pulse Ox (%): 91





- Physical Exam


General: Alert, In no apparent distress, Cooperative, Obese, Other (LETHARGIC)


HEENT: Atraumatic, Normocephalic, EOMI


Respiratory: Diminished (No wheezing or crackles appreciated)


Cardiovascular: Normal S1 S2, Edema, Irregular heart rate/rhythm


Musculoskeletal: Swelling, Erythema, Tenderness, Other (bilateral lower 

extremity edema.  LLE wrapped.  )


Neurological: Normal speech, Sensation intact, Normal affect





Assessment & Plan





- Problems (Diagnosis)


(1) Hypertensive emergency


Current Visit: Yes   Status: Acute   





(2) Acute decompensated heart failure


Current Visit: Yes   Status: Acute   





(3) CAROLE (acute kidney injury)


Current Visit: Yes   Status: Acute   





(4) Stage II pressure ulcer of buttock


Current Visit: Yes   Status: Acute   





(5) Left leg cellulitis


Current Visit: Yes   Status: Acute   





(6) Atrial fibrillation


Current Visit: Yes   Status: Acute   





(7) Hypertension


Current Visit: Yes   Status: Acute   


Physician Review Additional Text: 





Assessment


Patient is a 86 year old  male with morbid obesity, HTN, atrial 

fibrillation who presented to the ER with worsening bilateral lower extremity 

edema and wounds and mild shortness of breath. He was found to have hypertensive

emergency with SBP > 195 mmHg, acute on chronic systolic HF and CAROLE.  He 

required ICU placement for nitroglycerin infusion and lasix injections.  2-D 

Echo shows EF of 40-44%.  He is still requiring diuresis. His renal function is 

slowly to recover. He had a negative renal and bladder ultrasound. Nephrology is

on board. 





Hypertensive emergency


Decompensated CHF


Atrial fibrillation


CAROLE


Leg wounds/cellulitis


Stage III pressure ulcer in the buttocks





PLAN


HOLD coreg due to bradycardia


Continue telemetry


Continue diuresis, monitor I/O daily, fluid restriction ( 1.5 L per day) AND 

salt restriction


Continue Hydralazine and nitrates 


Pradaxa for CVA prevention


Will defer management of CAROLE to Nephrology


Continue wound care for as per Wound nurse instructions


Patient will need to go to rehab.

## 2020-05-17 LAB
BUN BLD-MCNC: 59 MG/DL (ref 7–18)
GLUCOSE SERPLBLD-MCNC: 112 MG/DL (ref 74–106)
HCT VFR BLD CALC: 25 % (ref 39.6–49)
LYMPHOCYTES # SPEC AUTO: 0.8 K/UL (ref 0.7–4.9)
MAGNESIUM SERPL-MCNC: 2.2 MG/DL (ref 1.8–2.4)
PMV BLD: 8.9 FL (ref 7.6–11.3)
POTASSIUM SERPL-SCNC: 4.2 MMOL/L (ref 3.5–5.1)
RBC # BLD: 2.88 M/UL (ref 4.33–5.43)

## 2020-05-17 RX ADMIN — MAGNESIUM OXIDE TAB 400 MG (241.3 MG ELEMENTAL MG) SCH MG: 400 (241.3 MG) TAB at 13:00

## 2020-05-17 RX ADMIN — ALBUMIN (HUMAN) SCH MG: 5 SOLUTION INTRAVENOUS at 09:45

## 2020-05-17 RX ADMIN — GABAPENTIN SCH MG: 100 CAPSULE ORAL at 20:59

## 2020-05-17 RX ADMIN — HYDRALAZINE HYDROCHLORIDE SCH MG: 25 TABLET, FILM COATED ORAL at 21:00

## 2020-05-17 RX ADMIN — DABIGATRAN ETEXILATE MESYLATE SCH MG: 75 CAPSULE ORAL at 09:49

## 2020-05-17 RX ADMIN — DABIGATRAN ETEXILATE MESYLATE SCH MG: 75 CAPSULE ORAL at 20:58

## 2020-05-17 RX ADMIN — SUCRALFATE SCH GM: 1 TABLET ORAL at 20:59

## 2020-05-17 RX ADMIN — HYDRALAZINE HYDROCHLORIDE SCH MG: 25 TABLET, FILM COATED ORAL at 09:49

## 2020-05-17 RX ADMIN — MAGNESIUM OXIDE TAB 400 MG (241.3 MG ELEMENTAL MG) SCH MG: 400 (241.3 MG) TAB at 09:48

## 2020-05-17 RX ADMIN — CHOLECALCIFEROL CAP 125 MCG (5000 UNIT) SCH UNIT: 125 CAP at 09:48

## 2020-05-17 RX ADMIN — SUCRALFATE SCH GM: 1 TABLET ORAL at 13:00

## 2020-05-17 RX ADMIN — Medication SCH PKT: at 09:51

## 2020-05-17 RX ADMIN — ISOSORBIDE DINITRATE SCH MG: 20 TABLET ORAL at 13:01

## 2020-05-17 RX ADMIN — PANTOPRAZOLE SODIUM SCH MG: 40 TABLET, DELAYED RELEASE ORAL at 09:49

## 2020-05-17 RX ADMIN — HYDRALAZINE HYDROCHLORIDE SCH MG: 25 TABLET, FILM COATED ORAL at 13:00

## 2020-05-17 RX ADMIN — Medication SCH ML: at 21:00

## 2020-05-17 RX ADMIN — ISOSORBIDE DINITRATE SCH MG: 20 TABLET ORAL at 21:00

## 2020-05-17 RX ADMIN — TAMSULOSIN HYDROCHLORIDE SCH MG: 0.4 CAPSULE ORAL at 20:59

## 2020-05-17 RX ADMIN — LOSARTAN POTASSIUM SCH MG: 50 TABLET, FILM COATED ORAL at 09:48

## 2020-05-17 RX ADMIN — MAGNESIUM OXIDE TAB 400 MG (241.3 MG ELEMENTAL MG) SCH MG: 400 (241.3 MG) TAB at 21:00

## 2020-05-17 RX ADMIN — Medication SCH APPL: at 09:51

## 2020-05-17 RX ADMIN — Medication SCH PKT: at 21:00

## 2020-05-17 RX ADMIN — ISOSORBIDE DINITRATE SCH MG: 20 TABLET ORAL at 09:49

## 2020-05-17 RX ADMIN — ALBUMIN (HUMAN) SCH MG: 5 SOLUTION INTRAVENOUS at 16:30

## 2020-05-17 RX ADMIN — SENNOSIDES AND DOCUSATE SODIUM SCH TAB: 8.6; 5 TABLET ORAL at 21:00

## 2020-05-17 RX ADMIN — SUCRALFATE SCH GM: 1 TABLET ORAL at 09:48

## 2020-05-17 RX ADMIN — Medication SCH ML: at 09:51

## 2020-05-17 RX ADMIN — SUCRALFATE SCH GM: 1 TABLET ORAL at 16:30

## 2020-05-17 NOTE — PN
Date of Progress Note:  05/17/2020



Mr. Barber has been followed for acute on chronic congestive heart failure that is systolic, ejectio
n fraction 44%.  Has elevated creatinine; paroxysmal atrial fibrillation, on Pradaxa.  Has hypertensi
on, diabetes, and anemia.  Continues to be a borderline hypotensive.  He is definitely bradycardic st
ill.  Dr. Lal yesterday decrease his Coreg, losartan, Neurontin, hydralazine, and Flomax.  I will di
scuss the case further with Nephrology today, but I think we can definitely get off the Coreg as his 
bradycardia definitely is significant and remains an issue.  He remains short of breath and fatigued.
  I would probably continue the losartan and continue the Lasix.





NB/MODL

DD:  05/17/2020 08:17:54Voice ID:  [id number]

DT:  05/17/2020 12:53:47Report ID:  [id number]

## 2020-05-17 NOTE — P.PN
Date of Service: 05/17/20





patient seen/examined.


labs/meds reviewed.





creatinine slightly higher but not a significant jump and patient is feeling 

well clinically.


calcium is lower.


bp better. multiple bp meds, gabapentin and flomax were adjusted yesterday.





vs stable. low bp at times.





lungs decreased bs b/l


cvs regular


abd soft/nt/nd/bs+


ext: impressive 2 + edema b/l (seems to be slightly better)





a/p:


asher?/CKD? stage 3/? cardiorenal/? 2nd to hx of htn/on multiple meds with bp 

improved at this time/continue current meds with bp monitoring. getting wound 

care and awaiting evaluation with  to coduct care planning. may be 

several days before baseline renal function becomes clear as we correct his 

volume statu, his wound heals and bp is stable. discussed plan with hospitalist 

and DR. Espinal (cardiologist)





MED CHANGES FROM YESTERDAY:


decrease losartan to 25mg daily


decrease gabapentin to 100mg qhs


decrease hydralazine to 25mg tid and hold for sbp <120


decrease flomax to 0.4 mg and hold for sbp <120


if bp improves, may consider increasing lasix in future and/or increasing 

losartan. if stabilizes on diuretics and ace/arb, may be able to cut back on 

hydralazine further and consider stopping. my stop gabapentin in future if able 

to tolerate stopping it.


likely contribution to swelling from gabapentin and hydralazine.


agree with betablocker. would avoid using hydralazine if betablocker is 

discontinued for any reason.

## 2020-05-17 NOTE — P.PN
Subjective


Date of Service: 05/17/20


Chief Complaint: shortness of breath and lower extremity edema


Subjective: Improving (Patient continues to improve.  OFF supplemental O2.)





Physical Examination





- Vital Signs


Temperature: 97.6 F


Blood Pressure: 128/60


Pulse: 62


Respirations: 20


Pulse Ox (%): 95





- Physical Exam


General: Alert, Cooperative, Other (slightly lethargic)


HEENT: Atraumatic, Normocephalic


Neck: Supple


Respiratory: Clear to auscultation bilaterally, Normal air movement


Cardiovascular: Normal pulses, Edema, Irregular heart rate/rhythm, Systolic 

murmur


Gastrointestinal: Normal bowel sounds, Soft and benign, Non-distended


Musculoskeletal: No clubbing, No contractures, No warmth, Swelling, Erythema, 

Tenderness


Neurological: Normal speech, Sensation intact, Normal affect





Assessment & Plan





- Problems (Diagnosis)


(1) Hypertensive emergency


Current Visit: Yes   Status: Acute   





(2) Acute decompensated heart failure


Current Visit: Yes   Status: Acute   





(3) CAROLE (acute kidney injury)


Current Visit: Yes   Status: Acute   





(4) Stage II pressure ulcer of buttock


Current Visit: Yes   Status: Acute   





(5) Left leg cellulitis


Current Visit: Yes   Status: Acute   





(6) Atrial fibrillation


Current Visit: Yes   Status: Acute   





(7) Hypertension


Current Visit: Yes   Status: Acute   


Physician Review Additional Text: 





Assessment


Patient is a 86 year old  male with morbid obesity, HTN, atrial 

fibrillation who presented to the ER with worsening bilateral lower extremity 

edema and wounds and mild shortness of breath. He was found to have hypertensive

emergency with SBP > 195 mmHg, acute on chronic systolic HF and CAROLE.  He 

required ICU placement for nitroglycerin infusion and lasix injections.  2-D 

Echo shows LVEF of 40-44%.  He is still on diuresis. His renal function slow to 

recover. Nephrology concerned about possible baseline CKD with superimposed ATN 

caused by recent BP issues, bradycardia and intravascular volume depletion.  He 

had a negative renal and bladder ultrasound.  Nephrology has reached out to 

[facility] to retrieve patient's renal records.  





Hypertensive emergency


Decompensated CHF


Atrial fibrillation


CAROLE


Leg wounds/cellulitis


Stage III pressure ulcer in the buttocks





PLAN


Continue holding coreg due to bradycardia


Continue telemetry


Continue diuresis, monitor I/O daily, fluid restriction ( 1.5 L per day) AND 

salt restriction


Nephrology attributes his swelling to gabapentin and hydralazine, which are now 

titrated down.  


Check ESR, CRP


Continue Pradaxa for CVA prevention


Continue wound care for as per Wound nurse instructions


Patient will need to go to rehab.

## 2020-05-18 LAB
BUN BLD-MCNC: 67 MG/DL (ref 7–18)
CREAT UR-SCNC: 36 MG/DL (ref 20–370)
GLUCOSE SERPLBLD-MCNC: 108 MG/DL (ref 74–106)
HCT VFR BLD CALC: 26.7 % (ref 39.6–49)
LYMPHOCYTES # SPEC AUTO: 1 K/UL (ref 0.7–4.9)
MAGNESIUM SERPL-MCNC: 2.3 MG/DL (ref 1.8–2.4)
PMV BLD: 8.8 FL (ref 7.6–11.3)
POTASSIUM SERPL-SCNC: 4.1 MMOL/L (ref 3.5–5.1)
PROT UR-MCNC: 46 MG/DL (ref ?–11.9)
RBC # BLD: 3.05 M/UL (ref 4.33–5.43)

## 2020-05-18 RX ADMIN — CHOLECALCIFEROL CAP 125 MCG (5000 UNIT) SCH UNIT: 125 CAP at 08:51

## 2020-05-18 RX ADMIN — MAGNESIUM OXIDE TAB 400 MG (241.3 MG ELEMENTAL MG) SCH MG: 400 (241.3 MG) TAB at 21:00

## 2020-05-18 RX ADMIN — ALBUMIN (HUMAN) SCH MG: 5 SOLUTION INTRAVENOUS at 08:54

## 2020-05-18 RX ADMIN — DABIGATRAN ETEXILATE MESYLATE SCH MG: 75 CAPSULE ORAL at 21:27

## 2020-05-18 RX ADMIN — SENNOSIDES AND DOCUSATE SODIUM SCH TAB: 8.6; 5 TABLET ORAL at 21:26

## 2020-05-18 RX ADMIN — SUCRALFATE SCH GM: 1 TABLET ORAL at 21:26

## 2020-05-18 RX ADMIN — SUCRALFATE SCH GM: 1 TABLET ORAL at 12:57

## 2020-05-18 RX ADMIN — HYDRALAZINE HYDROCHLORIDE SCH MG: 25 TABLET, FILM COATED ORAL at 08:52

## 2020-05-18 RX ADMIN — Medication SCH PKT: at 21:27

## 2020-05-18 RX ADMIN — SUCRALFATE SCH GM: 1 TABLET ORAL at 08:52

## 2020-05-18 RX ADMIN — HYDRALAZINE HYDROCHLORIDE SCH MG: 25 TABLET, FILM COATED ORAL at 21:26

## 2020-05-18 RX ADMIN — DABIGATRAN ETEXILATE MESYLATE SCH MG: 75 CAPSULE ORAL at 08:52

## 2020-05-18 RX ADMIN — Medication SCH ML: at 08:54

## 2020-05-18 RX ADMIN — HYDRALAZINE HYDROCHLORIDE SCH MG: 25 TABLET, FILM COATED ORAL at 12:58

## 2020-05-18 RX ADMIN — PANTOPRAZOLE SODIUM SCH MG: 40 TABLET, DELAYED RELEASE ORAL at 08:51

## 2020-05-18 RX ADMIN — ISOSORBIDE DINITRATE SCH MG: 20 TABLET ORAL at 12:58

## 2020-05-18 RX ADMIN — ISOSORBIDE DINITRATE SCH MG: 20 TABLET ORAL at 08:55

## 2020-05-18 RX ADMIN — Medication SCH PKT: at 08:55

## 2020-05-18 RX ADMIN — MAGNESIUM OXIDE TAB 400 MG (241.3 MG ELEMENTAL MG) SCH MG: 400 (241.3 MG) TAB at 08:51

## 2020-05-18 RX ADMIN — ACETAMINOPHEN PRN MG: 500 TABLET, FILM COATED ORAL at 21:24

## 2020-05-18 RX ADMIN — ALBUMIN (HUMAN) SCH MG: 5 SOLUTION INTRAVENOUS at 16:15

## 2020-05-18 RX ADMIN — LOSARTAN POTASSIUM SCH MG: 50 TABLET, FILM COATED ORAL at 08:50

## 2020-05-18 RX ADMIN — SUCRALFATE SCH GM: 1 TABLET ORAL at 16:14

## 2020-05-18 RX ADMIN — Medication SCH APPL: at 08:56

## 2020-05-18 RX ADMIN — MAGNESIUM OXIDE TAB 400 MG (241.3 MG ELEMENTAL MG) SCH MG: 400 (241.3 MG) TAB at 12:57

## 2020-05-18 RX ADMIN — Medication SCH ML: at 21:27

## 2020-05-18 RX ADMIN — ISOSORBIDE DINITRATE SCH MG: 20 TABLET ORAL at 21:27

## 2020-05-18 RX ADMIN — GABAPENTIN SCH MG: 100 CAPSULE ORAL at 21:26

## 2020-05-18 RX ADMIN — TAMSULOSIN HYDROCHLORIDE SCH MG: 0.4 CAPSULE ORAL at 21:27

## 2020-05-18 NOTE — P.PN
Subjective


Date of Service: 05/18/20


Chief Complaint: shortness of breath and lower extremity edema





<Gray Colunga - Last Filed: 05/18/20 16:33>


Date of Service: 05/18/20


Subjective: Other (Agree with plan of care.  Patient seen and evaluated with 

nurse practitioner.)





<Kevin Castillo - Last Filed: 05/18/20 17:24>





Review of Systems


General: Unremarkable


Eyes: Unremarkable


ENT: Unremarkable


Respiratory: Unremarkable


Cardiovascular: Unremarkable


Gastrointestinal: Unremarkable


Genitourinary: Unremarkable


Musculoskeletal: Unremarkable


Integumentary: Unremarkable


Neurological: Unremarkable


Lymphatics: Unremarkable





<Gray Colunga - Last Filed: 05/18/20 16:33>





Physical Examination





- Vital Signs


Temperature: 98.2 F


Blood Pressure: 148/52


Pulse: 54


Respirations: 18


Pulse Ox (%): 97





- Physical Exam


General: Alert, In no apparent distress, Oriented x3


HEENT: Atraumatic, Normocephalic


Neck: Supple


Respiratory: Normal air movement


Cardiovascular: Normal pulses, Normal S1 S2


Capillary refill: <2 Seconds


Gastrointestinal: Normal bowel sounds


Musculoskeletal: No clubbing, No swelling


Integumentary: Other (Lower extremity lymphedema)


Neurological: Normal speech


Lymphatics: No axilla or inguinal lymphadenopathy





<Gray Colunga - Last Filed: 05/18/20 16:33>





- Physical Exam


Integumentary: No tenderness/swelling, No erythema, No warmth, No cyanosis





- Studies


Medications List Reviewed: Yes





<Kevin Castillo - Last Filed: 05/18/20 17:24>





Assessment & Plan


Discharge Plan: Other (Inpatient rehab)


Plan to discharge in: 24 Hours





- Code Status/Comfort Care


Code Status Assessed: Yes


Physician Review Additional Text: 








Assessment


Hypertensive emergency


Decompensated CHF


Atrial fibrillation


CAROLE


Leg wounds/cellulitis


Stage III pressure ulcer in the buttocks





PLAN


Hypertensive emergency-this has improved.  continue with cardiology and 

nephrology recommendations.  Continue telemetry.  Working with  

to determine if patient can be placed in inpatient rehab.  If not patient may 

require placement in a nursing home temporarily.  Anticipate possible discharge 

tomorrow.


Decompensated CHF-this has improved.  continue with cardiology and nephrology 

recommendations.  Continue with 1.5L fluid restriction and low sodium diet.


Atrial fibrillation- continue Pradaxa for CVA prevention.  Continue with 

telemetry


CAROLE- continue with nephrology recommendations, no acute changes.


Leg wounds/cellulitis- continue with wound healing recommendations.


Stage III pressure ulcer in the buttocks- continue with wound healing 

recommendations.





























Critical Care: No


Time Spent Managing Pts Care (In Minutes): 55





<Gray Colunga - Last Filed: 05/18/20 16:33>


Discharge Plan: Other


Physician Review Additional Text: 





Patient seen and examined with nurse practitioner.  Agree with plan of care.  

Case to also discuss with nephrology.  Continue to adjust medication.  

Nephrology plans to adjust blood pressure medication.  Awaiting approval for 

patient to go to inpatient rehab.  Anticipate acceptance within the next 24-48 

hr.  Deeper compensated CHF related to acute on chronic systolic CHF.  Patient 

with underlying acute on chronic renal disease stage IV.





<Kevin Castillo - Last Filed: 05/18/20 17:24>

## 2020-05-18 NOTE — PN
Date of Progress Note:  05/18/2020



Mr. Barber has been followed for acute on chronic systolic congestive heart failure, ejection fracti
on of 44%.  He has renal insufficiency as well.  He has chronic lymphedema, which is stable.  From a 
CHF standpoint, he is breathing better, his O2 saturations are adequate.  His chest is clear. 

His vital signs are stable.  He is in sinus bradycardia with PVC.  His blood pressure is better after
 multiple medication changes by Nephrology.  I agree with his present regimen.  Continue present ther
apy.  He will need close followup after he gets discharged.  I will be happy to see him in the office
 in the next 2 to 3 weeks.





NB/SIDNEY

DD:  05/18/2020 07:54:50Voice ID:  [id number]

DT:  05/18/2020 10:36:24Report ID:  [id number]

## 2020-05-19 LAB
BUN BLD-MCNC: 82 MG/DL (ref 7–18)
GLUCOSE SERPLBLD-MCNC: 102 MG/DL (ref 74–106)
MAGNESIUM SERPL-MCNC: 2.2 MG/DL (ref 1.8–2.4)
POTASSIUM SERPL-SCNC: 4.3 MMOL/L (ref 3.5–5.1)

## 2020-05-19 RX ADMIN — MAGNESIUM OXIDE TAB 400 MG (241.3 MG ELEMENTAL MG) SCH MG: 400 (241.3 MG) TAB at 20:36

## 2020-05-19 RX ADMIN — HYDRALAZINE HYDROCHLORIDE SCH MG: 25 TABLET, FILM COATED ORAL at 20:36

## 2020-05-19 RX ADMIN — SUCRALFATE SCH GM: 1 TABLET ORAL at 20:37

## 2020-05-19 RX ADMIN — MAGNESIUM OXIDE TAB 400 MG (241.3 MG ELEMENTAL MG) SCH MG: 400 (241.3 MG) TAB at 14:12

## 2020-05-19 RX ADMIN — SUCRALFATE SCH GM: 1 TABLET ORAL at 12:37

## 2020-05-19 RX ADMIN — Medication SCH PKT: at 20:37

## 2020-05-19 RX ADMIN — LOSARTAN POTASSIUM SCH MG: 50 TABLET, FILM COATED ORAL at 09:08

## 2020-05-19 RX ADMIN — SENNOSIDES AND DOCUSATE SODIUM SCH TAB: 8.6; 5 TABLET ORAL at 20:38

## 2020-05-19 RX ADMIN — HYDRALAZINE HYDROCHLORIDE SCH MG: 25 TABLET, FILM COATED ORAL at 09:08

## 2020-05-19 RX ADMIN — ACETAMINOPHEN PRN MG: 500 TABLET, FILM COATED ORAL at 20:35

## 2020-05-19 RX ADMIN — CHOLECALCIFEROL CAP 125 MCG (5000 UNIT) SCH UNIT: 125 CAP at 09:07

## 2020-05-19 RX ADMIN — PANTOPRAZOLE SODIUM SCH MG: 40 TABLET, DELAYED RELEASE ORAL at 09:08

## 2020-05-19 RX ADMIN — ISOSORBIDE DINITRATE SCH MG: 20 TABLET ORAL at 20:36

## 2020-05-19 RX ADMIN — GABAPENTIN SCH MG: 100 CAPSULE ORAL at 20:36

## 2020-05-19 RX ADMIN — DABIGATRAN ETEXILATE MESYLATE SCH MG: 75 CAPSULE ORAL at 20:36

## 2020-05-19 RX ADMIN — Medication SCH PKT: at 09:07

## 2020-05-19 RX ADMIN — SUCRALFATE SCH GM: 1 TABLET ORAL at 09:07

## 2020-05-19 RX ADMIN — Medication SCH ML: at 09:07

## 2020-05-19 RX ADMIN — ISOSORBIDE DINITRATE SCH MG: 20 TABLET ORAL at 14:11

## 2020-05-19 RX ADMIN — DABIGATRAN ETEXILATE MESYLATE SCH MG: 75 CAPSULE ORAL at 09:09

## 2020-05-19 RX ADMIN — HYDRALAZINE HYDROCHLORIDE SCH MG: 25 TABLET, FILM COATED ORAL at 14:12

## 2020-05-19 RX ADMIN — Medication SCH ML: at 20:37

## 2020-05-19 RX ADMIN — ALBUMIN (HUMAN) SCH MG: 5 SOLUTION INTRAVENOUS at 09:08

## 2020-05-19 RX ADMIN — TAMSULOSIN HYDROCHLORIDE SCH MG: 0.4 CAPSULE ORAL at 20:37

## 2020-05-19 RX ADMIN — SUCRALFATE SCH GM: 1 TABLET ORAL at 17:21

## 2020-05-19 RX ADMIN — ISOSORBIDE DINITRATE SCH MG: 20 TABLET ORAL at 09:07

## 2020-05-19 RX ADMIN — MAGNESIUM OXIDE TAB 400 MG (241.3 MG ELEMENTAL MG) SCH MG: 400 (241.3 MG) TAB at 09:08

## 2020-05-19 RX ADMIN — Medication SCH APPL: at 17:21

## 2020-05-19 NOTE — PN
Date of Progress Note:  05/18/2020



Subjective:  Patient seen at bedside.  No events reported.  The patient feels well.  He denies any fe
vers, chills, chest pain, shortness of breath, nausea, vomiting, or diarrhea.



Objective:  Vital Signs:  Blood pressure is 148/52, pulse 54, afebrile.  Input and output 1680 in, 15
75 out.  Thus far today, 760 in, 875 out. 

General:  Elderly, no acute distress. 

Heart:  Regular rate and rhythm.  No murmurs, rubs, or gallops. 

Lungs:  Diminished breath sounds at bases. 

Abdomen:  Soft, nontender, nondistended.  Positive bowel sounds x4. 

Extremities:  With 2 to 3+ edema.



Laboratory Data:  CBC, hemoglobin 8.5, hematocrit 26.7.  Serum chemistry, BUN and creatinine 67/3.5, 
which has been rising from 2.39 on presentation.  CRP is 6.73.  BNP 10,262.  Urinalysis, protein crea
tinine ratio shows 1.2 g of proteinuria.



Current Medications:  Reviewed.



Impression:  

1.Acute systolic congestive heart failure.

2.Acute kidney injury secondary to cardiorenal syndrome.

3.Chronic lymphedema.

4.Atrial fibrillation.

5.Leg and decubitus wounds.



Plan:  

1.Patient's blood pressure has risen some.  The patient will continue diuresis and I would like to s
ee the blood pressure trend going tomorrow.  Recommendation would be to increase hydralazine to 50 mg
 p.o. t.i.d.  The creatinine is continuing to rise and is likely the patient is intravascular volume 
depleted and Lasix will need to be cut down to maintenance dose, which I believe 40 mg daily would be
 adequate.  Losartan would also need to be held if creatinine is rising.

2.Unclear of patient's baseline creatinine.  The patient was living in another part of [place] for man
y years.  However, he did state that he had blood work in October of last year, was not told of any r
enal dysfunction.

3.Avoid NSAIDs.  Avoid contrast.  Continue monitoring strict ins and outs.  Will continue to follow.






SE/MODL

DD:  05/18/2020 23:58:19Voice ID:  [id number]

DT:  05/19/2020 01:42:38Report ID:  [id number]

## 2020-05-19 NOTE — RAD REPORT
EXAM DESCRIPTION:  RAD - Chest Single View - 5/19/2020 6:54 pm

 

CLINICAL HISTORY:  chf

Chest pain.

 

COMPARISON:  Chest Single View dated 5/13/2020

 

FINDINGS:  Portable technique limits examination quality.

 

Mild improvement is seen pulmonary edema and pleural effusion pattern since the comparative study. Th
e heart is significantly enlarged in size. No displaced fractures.

 

IMPRESSION:  Mild improvement in CHF.

## 2020-05-19 NOTE — WHHP
Date of Admission:  05/13/2020



Subjective:  Patient is seen at the bedside.  No overnight events reported.  Urine output yesterday w
as 1050 with 1575 out today.  The patient did have some hypotension in the past 24-48 hours, however,
 that is rebounding.  States his dyspnea has improved, but overall edema remains stable.  Speaking to
 Mr. Barber, he tells me that he has been practically better chair-bound for quite some time.  He ha
s not been able to move much.  He has sores on his bottom.  He states the edema has been there for so
metime as well.  He denied being ever told of any renal disease and stated that he had some lab work 
drawn in October and he was told that he had renal disease; however, he is unclear of the baseline cr
eatinine. 



He continues on Lasix 40 mg IV b.i.d.



Objective:  Vital Signs:  Blood pressure 148/52, pulse 54, afebrile.  Input and output 1608 in, 1575 
out.  

General:  No acute distress. 

Heart:  Distant heart sounds.  Regular rate and rhythm.  No murmurs, rubs, or gallops. 

Lungs:  Diminished breath sounds at the bases.  

Abdomen:  Soft, nontender, nondistended.  Extremities:  2+ edema bilaterally.



Laboratory Data:  Sodium 137, potassium 4.1, chloride 101, CO2 28, BUN 67, creatinine 3.5, glucose 10
8, calcium 8.6, magnesium 2.3.  BNP is 10,262, which improved from a peak of 15,000.  CBC, hemoglobin
 8.5, hematocrit 26.7.



Current Medications:  Reviewed.  Continues on Lasix 40 mg IV b.i.d., also on losartan 25 mg p.o. briana guerra



Impression:  

1.Acute kidney injury on possible underlying chronic kidney disease and cardiorenal syndrome.  

2.Acute on chronic systolic congestive heart failure.

3.Hypoalbuminemia.

4.Lymphedema.

5.Hypertension.

6.Atrial fibrillation.



Plan:  The patient appears to improve from an acute CHF perspective.  His lungs are clear.  He is nicholas
athing comfortably.  He does have longstanding edema and much of this may not be amenable to aggressi
ve diuresis.  I would decrease his Lasix to 40 mg IV daily.  I think his intravascular volume is depl
eted.  If the patient's creatinine continues to rise tomorrow, he will likely need to be discontinued
 off the ARB until his renal function is more stable.  I have sent out urine assays to look at the le
betina of proteinuria that he has to see if he has any nephrotic syndrome.  Likely, this is all due to c
ardiorenal syndrome.  His level of creatinine precludes the use of spironolactone.  I would defer to 
Cardiology and choice of beta-blocker for blood pressure control.  However, would avoid increasing AC
E or ARB at this time.  Initially will lean toward discontinuation tomorrow if renal function worsens
.  The patient may benefit from a combination of beta blocker and possibly hydralazine.  However, we 
will defer to Cardiology in that regard.





JOSE

DD:  05/18/2020 17:06:04Voice ID:  [id number]

## 2020-05-19 NOTE — P.PN
Subjective


Date of Service: 05/19/20


Chief Complaint: shortness of breath and lower extremity edema


Subjective: Doing well





Physical Examination





- Vital Signs


Temperature: 98.0 F


Blood Pressure: 140/53


Pulse: 65


Respirations: 18


Pulse Ox (%): 99





- Physical Exam


General: Alert, In no apparent distress, Oriented x3, Cooperative


HEENT: Atraumatic


Neck: Supple


Respiratory: Clear to auscultation bilaterally, Normal air movement


Cardiovascular: Normal pulses, Regular rate/rhythm


Gastrointestinal: Normal bowel sounds, Soft and benign, Non-distended


Neurological: Normal speech, Normal strength at 5/5 x4 extr, Normal tone, Normal

affect





- Studies


Medications List Reviewed: Yes





Assessment & Plan


Discharge Plan: Other (Inpatient rehab)


Plan to discharge in: 24 Hours


Physician Review Additional Text: 











Assessment


Hypertensive emergency


Decompensated CHF


Atrial fibrillation


CAROLE


Leg wounds/cellulitis


Stage III pressure ulcer in the buttocks





PLAN


Hypertensive emergency-patient has done well.  Continue with medication.  Case 

discussed with .  Currently looking to send patient to inpatient 

rehab.  If this fells will pursue skilled placement.  This was discussed in 

detail with patient who agrees with plan of care.  Anticipate approval by 

inpatient rehab hopefully today.


Decompensated CHF-this has improved.  continue with cardiology and nephrology 

recommendations.  Continue with 1.5L fluid restriction and low sodium diet.


Atrial fibrillation- continue Pradaxa for CVA prevention.  Continue with 

telemetry


CAROLE- continue with nephrology recommendations, no acute changes.


Leg wounds/cellulitis- continue with wound healing recommendations.


Stage III pressure ulcer in the buttocks- continue with wound healing 

recommendations.


Time Spent Managing Pts Care (In Minutes): 55

## 2020-05-19 NOTE — EKG
Test Date:    2020-05-16               Test Time:    16:47:19

Technician:   BOUCHRA                                     

                                                     

MEASUREMENT RESULTS:                                       

Intervals:                                           

Rate:         44                                     

AK:                                                  

QRSD:         88                                     

QT:           514                                    

QTc:          439                                    

Axis:                                                

P:                                                   

AK:                                                  

QRS:          -45                                    

T:            86                                     

                                                     

INTERPRETIVE STATEMENTS:                                       

                                                     

Atrial fibrillation with slow ventricular response

Left axis deviation

Minimal voltage criteria for LVH, may be normal variant

Inferior infarct, age undetermined

Anteroseptal infarct, age undetermined

Abnormal ECG

Compared to ECG 05/13/2020 08:38:16

Ventricular premature complex(es) no longer present

Myocardial infarct finding still present



Electronically Signed On 05-19-20 10:47:24 CDT by Ralf Espinal

## 2020-05-19 NOTE — PN
Date of Progress Note:  05/19/2020



Subjective:  Patient is seen at the bedside.  No overnight events reported.  The patient feels well. 
 He denies any fevers, chills, chest pain, shortness of breath, nausea, vomiting, or diarrhea.



Objective:  Vital Signs:  Blood pressure is 140/53, pulse 65, afebrile.  Input and output 1680 in and
 1575 out.  Today, it has been 1070 in and 2775 out, negative balance of 1.7 L.  

General:  No acute distress. 

Heart:  Regular rate and rhythm.  No murmurs, rubs, gallops. 

Lungs:  Diminished breath sounds at bases. 

Abdomen:  Soft, nontender, nondistended. Extremities:  2+ edema.



Laboratory Data:  CBC, hemoglobin 8.5, hematocrit 26.7.  Serum chemistry, BUN 82, creatinine 3.57.  U
A showed 1.28 g of proteinuria.  The patient's serum electrophoresis is pending as well as urine elec
trophoresis.



Current Medications:  Reviewed.  Of note, on Lasix 40 mg IV daily as well as hydralazine 25 mg p.o. t
.i.d.  No other medications were noted.



Impression:  

1.Acute kidney injury on possible underlying chronic kidney disease, and cardiorenal syndrome.

2.Acute on chronic systolic congestive heart failure.

3.Hypoalbuminemia.

4.Lymphedema.

5.Hypertension.

6.Atrial fibrillation.



Plan:  The patient's volume status appears to be stable.  He has had significant diuresis yesterday w
ith Lasix administration.  With his worsening azotemia and hypochloremia, I believe the patient is ac
hieving intravascular euvolemia.  Decrease Lasix to 40 mg IV daily.  I have also discontinued his ARB
.  Hydralazine dose has been increased to modify for hypertension.  The patient does not have signifi
cant 

proteinuria and thus nephrotic syndrome is not likely.  Avoid all NSAIDs.  Avoid all contrast.  We wi
ll continue to follow.





SE/MODL

DD:  05/19/2020 16:58:58Voice ID:  [id number]

DT:  05/19/2020 20:30:05Report ID:  [id number]

## 2020-05-20 LAB
BUN BLD-MCNC: 84 MG/DL (ref 7–18)
GLUCOSE SERPLBLD-MCNC: 97 MG/DL (ref 74–106)
POTASSIUM SERPL-SCNC: 3.8 MMOL/L (ref 3.5–5.1)
UA COMPLETE W REFLEX CULTURE PNL UR: (no result)

## 2020-05-20 RX ADMIN — GABAPENTIN SCH MG: 100 CAPSULE ORAL at 20:49

## 2020-05-20 RX ADMIN — SUCRALFATE SCH GM: 1 TABLET ORAL at 13:04

## 2020-05-20 RX ADMIN — ALBUMIN (HUMAN) SCH MG: 5 SOLUTION INTRAVENOUS at 08:22

## 2020-05-20 RX ADMIN — TAMSULOSIN HYDROCHLORIDE SCH MG: 0.4 CAPSULE ORAL at 20:49

## 2020-05-20 RX ADMIN — CHOLECALCIFEROL CAP 125 MCG (5000 UNIT) SCH UNIT: 125 CAP at 08:23

## 2020-05-20 RX ADMIN — MAGNESIUM OXIDE TAB 400 MG (241.3 MG ELEMENTAL MG) SCH MG: 400 (241.3 MG) TAB at 13:04

## 2020-05-20 RX ADMIN — HYDRALAZINE HYDROCHLORIDE SCH MG: 25 TABLET, FILM COATED ORAL at 13:04

## 2020-05-20 RX ADMIN — SUCRALFATE SCH GM: 1 TABLET ORAL at 16:15

## 2020-05-20 RX ADMIN — Medication SCH: at 08:22

## 2020-05-20 RX ADMIN — Medication SCH APPL: at 08:22

## 2020-05-20 RX ADMIN — Medication SCH PKT: at 20:50

## 2020-05-20 RX ADMIN — ISOSORBIDE DINITRATE SCH MG: 20 TABLET ORAL at 08:20

## 2020-05-20 RX ADMIN — HYDRALAZINE HYDROCHLORIDE SCH MG: 25 TABLET, FILM COATED ORAL at 20:49

## 2020-05-20 RX ADMIN — DABIGATRAN ETEXILATE MESYLATE SCH MG: 75 CAPSULE ORAL at 20:49

## 2020-05-20 RX ADMIN — DABIGATRAN ETEXILATE MESYLATE SCH MG: 75 CAPSULE ORAL at 08:22

## 2020-05-20 RX ADMIN — MAGNESIUM OXIDE TAB 400 MG (241.3 MG ELEMENTAL MG) SCH MG: 400 (241.3 MG) TAB at 08:20

## 2020-05-20 RX ADMIN — PANTOPRAZOLE SODIUM SCH MG: 40 TABLET, DELAYED RELEASE ORAL at 08:21

## 2020-05-20 RX ADMIN — Medication SCH ML: at 20:50

## 2020-05-20 RX ADMIN — MAGNESIUM OXIDE TAB 400 MG (241.3 MG ELEMENTAL MG) SCH MG: 400 (241.3 MG) TAB at 20:50

## 2020-05-20 RX ADMIN — Medication SCH ML: at 08:22

## 2020-05-20 RX ADMIN — SUCRALFATE SCH GM: 1 TABLET ORAL at 20:49

## 2020-05-20 RX ADMIN — ISOSORBIDE DINITRATE SCH MG: 20 TABLET ORAL at 13:04

## 2020-05-20 RX ADMIN — SUCRALFATE SCH GM: 1 TABLET ORAL at 08:21

## 2020-05-20 RX ADMIN — HYDRALAZINE HYDROCHLORIDE SCH MG: 25 TABLET, FILM COATED ORAL at 08:21

## 2020-05-20 RX ADMIN — SENNOSIDES AND DOCUSATE SODIUM SCH TAB: 8.6; 5 TABLET ORAL at 20:49

## 2020-05-20 RX ADMIN — ISOSORBIDE DINITRATE SCH MG: 20 TABLET ORAL at 20:49

## 2020-05-20 NOTE — P.PN
Subjective


Date of Service: 05/20/20


Chief Complaint: shortness of breath and lower extremity edema


Subjective: Doing well





Physical Examination





- Vital Signs


Temperature: 98.8 F


Blood Pressure: 114/70


Pulse: 60


Respirations: 20


Pulse Ox (%): 98





- Physical Exam


General: Alert, In no apparent distress, Cooperative


HEENT: Atraumatic


Neck: Supple


Respiratory: Clear to auscultation bilaterally, Normal air movement


Cardiovascular: Irregular heart rate/rhythm (AFib rate controlled)


Gastrointestinal: Normal bowel sounds, No tenderness, No masses, No rebound, No 

guarding


Neurological: Normal speech, Normal strength at 5/5 x4 extr, Normal tone, Normal

affect





- Studies


Medications List Reviewed: Yes





Assessment & Plan


Discharge Plan: Other (Skilled nursing facility)


Plan to discharge in: 24 Hours


Physician Review Additional Text: 











Assessment


Hypertensive emergency


Acute on chronic systolic CHF


Paroxysmal Atrial fibrillation on chronic anti coagulation therapy


Acute on chronic renal disease stage IV


Leg wounds/cellulitis


Stage III pressure ulcer in the buttocks


GERD





PLAN


Hypertensive emergency:  This is much improved after adjustments made by 

Cardiology and Nephrology.  Patient currently on hydralazine.  Case discussed at

length with patient and social work.  Also got in contact with [employer] [facility]lin[facility], 

his insurance.  Apparently they will deny him inpatient rehab.  Therefore will 

pursue skilled placement.  Physical therapy still recommends highly skilled 

placement.  Patient agrees.  Await approval to skilled facility.


Acute on chronic systolic CHF:  Continue with current medication.  Continue with

fluid restriction as well.  X-ray showed improvement yesterday. Continue with 

physical therapy to built-up endurance.


Paroxysmal atrial fibrillation on chronic anti coagulation therapy:  Continue 

with current medication.  Patient on Pradaxa.  Overall stable.  Rate controlled.




Acute on chronic renal disease stage IV:  Case discussed at length with 

nephrology.  Nephrology continues to adjust medication. 


Leg wounds/cellulitis:  Continue with wound healing recommendations.


Stage III pressure ulcer in the buttocks:  Continue wound healing 

recommendations.


GERD:  Continue with medication


Time Spent Managing Pts Care (In Minutes): 55

## 2020-05-20 NOTE — PN
Date of Progress Note:  05/20/2020



Subjective:  Patient is seen at the bedside.  Patient feels well.  He is getting ready for physical t
herapy.  He denies any fevers, chills, chest pain, shortness of breath, nausea, vomiting, or diarrhea
.



Objective:  Vital Signs:  Blood pressure 143/43, pulse 55, afebrile.  

General:  No acute distress. 

HEART:  Regular rate and rhythm.  No murmurs, rubs, or gallops. 

Lungs:  Diminished breath sounds at the bases. 

Abdomen:  Soft, nontender. 

Extremities:  With 2+ edema of the bilateral lower extremities.



Laboratory Data:  CBC:  Hemoglobin 8.5, hematocrit 26.7.  Serum chemistry:  Potassium 3.8, BUN and cr
eatinine are 84 and 3.61.



Impression:  

1.Acute kidney injury on possible underlying chronic kidney disease with cardiorenal syndrome.

2.Acute-on-chronic systolic congestive heart failure.

3.Hypoalbuminemia.

4.Lymphedema.

5.Hypertension.

6.Atrial fibrillation.



Plan:  The patient continues to have edema and chest x-ray does show that he does have persistent alt
rama mildly improved effusions.  Lasix was decreased yesterday.  However, it has not shown any signi
ficant improvement in the patient's renal function.  In terms of workup, a prior UA did show 1.28 g o
f proteinuria.  His immunofixation is pending at this time.  I will repeat the UA as he did have scan
t hematuria on the prior UA.  If hematuria persists, further workup would be necessary that has yet t
o be seen.  However, he is on Pradaxa, which can lead inevitably to some mild hematuria of non-glomer
ular origin. 



The patient may likely have an ATN, which was hemodynamically mediated.  Looking at patient's blood p
ressures on admission, he was in the 180s to 190s range with unclear control in the outpatient settin
g.  The blood pressure was brought down into the 130s range, and he also had some into the 1-teens.  
This may have been relative hypotension leading to an ATN.  Currently, blood pressures are in 130s to
 140s.  I have decreased to modify the prior antihypertensive regimen to allow some recovery of blood
 pressure.  The patient may have a prolonged process of recovery if this is ATN; however, the patient
 is having robust urine output which is encouraging.  Continue to avoid NSAIDs, avoid contrast, renal
 dose all medications.  We will continue to follow.





SE/MODL

DD:  05/20/2020 10:37:43Voice ID:  [id number]

DT:  05/20/2020 15:29:16Report ID:  [id number]

## 2020-05-20 NOTE — RAD REPORT
EXAM DESCRIPTION:  US - Urinary Bladder - 5/20/2020 11:02 am

 

CLINICAL HISTORY:  eval for retention

Pelvic pain.

 

COMPARISON:  Urinary Bladder dated 5/14/2020

 

TECHNIQUE:  Real-time sonographic evaluation of the urinary bladder with pre and postvoid volume magdalena
urements was performed.

 

FINDINGS:  No evidence of urinary bladder mass or ureterocele. Prevoid bladder volume measured 230 mL
. Postvoid bladder volume measured 19 mL.

 

IMPRESSION:  Minimal postvoid residual noted measuring 19 mL.

## 2020-05-21 VITALS — OXYGEN SATURATION: 97 %

## 2020-05-21 LAB — CRP SERPL-MCNC: 41.7 MG/L (ref ?–3)

## 2020-05-21 RX ADMIN — Medication SCH ML: at 08:32

## 2020-05-21 RX ADMIN — HYDRALAZINE HYDROCHLORIDE SCH MG: 25 TABLET, FILM COATED ORAL at 13:24

## 2020-05-21 RX ADMIN — HYDRALAZINE HYDROCHLORIDE SCH MG: 25 TABLET, FILM COATED ORAL at 21:14

## 2020-05-21 RX ADMIN — MAGNESIUM OXIDE TAB 400 MG (241.3 MG ELEMENTAL MG) SCH MG: 400 (241.3 MG) TAB at 21:00

## 2020-05-21 RX ADMIN — ALBUMIN (HUMAN) SCH MG: 5 SOLUTION INTRAVENOUS at 08:39

## 2020-05-21 RX ADMIN — ALBUMIN (HUMAN) SCH MG: 5 SOLUTION INTRAVENOUS at 08:32

## 2020-05-21 RX ADMIN — HYDRALAZINE HYDROCHLORIDE SCH MG: 25 TABLET, FILM COATED ORAL at 08:32

## 2020-05-21 RX ADMIN — ACETAMINOPHEN PRN MG: 500 TABLET, FILM COATED ORAL at 03:16

## 2020-05-21 RX ADMIN — Medication SCH PKT: at 21:15

## 2020-05-21 RX ADMIN — Medication SCH ML: at 21:15

## 2020-05-21 RX ADMIN — CHOLECALCIFEROL CAP 125 MCG (5000 UNIT) SCH UNIT: 125 CAP at 08:31

## 2020-05-21 RX ADMIN — TAMSULOSIN HYDROCHLORIDE SCH MG: 0.4 CAPSULE ORAL at 21:14

## 2020-05-21 RX ADMIN — GABAPENTIN SCH MG: 100 CAPSULE ORAL at 21:13

## 2020-05-21 RX ADMIN — SUCRALFATE SCH GM: 1 TABLET ORAL at 21:14

## 2020-05-21 RX ADMIN — SUCRALFATE SCH GM: 1 TABLET ORAL at 08:31

## 2020-05-21 RX ADMIN — DABIGATRAN ETEXILATE MESYLATE SCH MG: 75 CAPSULE ORAL at 21:14

## 2020-05-21 RX ADMIN — Medication SCH PKT: at 08:32

## 2020-05-21 RX ADMIN — PANTOPRAZOLE SODIUM SCH MG: 40 TABLET, DELAYED RELEASE ORAL at 08:31

## 2020-05-21 RX ADMIN — MAGNESIUM OXIDE TAB 400 MG (241.3 MG ELEMENTAL MG) SCH MG: 400 (241.3 MG) TAB at 08:32

## 2020-05-21 RX ADMIN — ISOSORBIDE DINITRATE SCH MG: 20 TABLET ORAL at 13:24

## 2020-05-21 RX ADMIN — MAGNESIUM OXIDE TAB 400 MG (241.3 MG ELEMENTAL MG) SCH MG: 400 (241.3 MG) TAB at 13:24

## 2020-05-21 RX ADMIN — ISOSORBIDE DINITRATE SCH MG: 20 TABLET ORAL at 08:32

## 2020-05-21 RX ADMIN — SENNOSIDES AND DOCUSATE SODIUM SCH TAB: 8.6; 5 TABLET ORAL at 21:14

## 2020-05-21 RX ADMIN — ACETAMINOPHEN PRN MG: 500 TABLET, FILM COATED ORAL at 21:13

## 2020-05-21 RX ADMIN — ISOSORBIDE DINITRATE SCH MG: 20 TABLET ORAL at 21:14

## 2020-05-21 RX ADMIN — DABIGATRAN ETEXILATE MESYLATE SCH MG: 75 CAPSULE ORAL at 08:32

## 2020-05-21 RX ADMIN — Medication SCH APPL: at 08:33

## 2020-05-21 RX ADMIN — SUCRALFATE SCH GM: 1 TABLET ORAL at 13:24

## 2020-05-21 RX ADMIN — ACETAMINOPHEN PRN MG: 500 TABLET, FILM COATED ORAL at 13:28

## 2020-05-21 RX ADMIN — SUCRALFATE SCH GM: 1 TABLET ORAL at 16:28

## 2020-05-21 NOTE — P.PN
Subjective


Date of Service: 05/21/20


Chief Complaint: shortness of breath and lower extremity edema


Subjective: Improving, Doing well





Physical Examination





- Vital Signs


Temperature: 97 F


Blood Pressure: 140/68


Pulse: 65


Respirations: 20


Pulse Ox (%): 95





- Physical Exam


General: Alert, In no apparent distress, Cooperative


HEENT: Atraumatic


Neck: Supple


Respiratory: Clear to auscultation bilaterally, Normal air movement


Cardiovascular: Irregular heart rate/rhythm (AFib rate controlled)


Gastrointestinal: Normal bowel sounds, Soft and benign, Non-distended


Musculoskeletal: No tenderness, No warmth


Integumentary: Other (Swelling to the lower extremities stable.)


Neurological: Normal speech, Normal strength at 5/5 x4 extr, Normal tone, Normal

affect





- Studies


Medications List Reviewed: Yes





Assessment & Plan


Discharge Plan: Other (Skilled nursing facility)


Physician Review Additional Text: 











Assessment


Hypertensive emergency


Acute on chronic systolic CHF


Paroxysmal Atrial fibrillation on chronic anti coagulation therapy


Acute on chronic renal disease stage IV with cardiorenal syndrome


Leg wounds/cellulitis


Stage III pressure ulcer in the buttocks


GERD


BPH





PLAN


Hypertensive emergency:  Blood pressure now stable on hydralazine 50 mg 1 pill 3

times a day.  Case discussed with nephrology yesterday.  Patient awaiting 

skilled placement facility approval.  Anticipate this will occur today.  If so 

patient will be discharged.  Patient was tested for COVID in preparation for the

patient to be sent to a skilled facility.


Acute on chronic systolic CHF:  Diuretics have been adjusted by Nephrology.  

Patient no longer on diuretic therapy at this time.  Continue 1500 cc per day 

fluid restriction.  Will need to monitor weight daily.  Will also monitor urine 

output. Continue with physical therapy to built-up endurance.


Paroxysmal atrial fibrillation on chronic anti coagulation therapy:  Continue 

with current medication.  Patient on Pradaxa.  Overall stable.  Rate controlled.




Acute on chronic renal disease stage IV with cardiorenal syndrome:  Case 

discussed with nephrology yesterday.  Continue with their recommendation.  

Overall stable.


Leg wounds/cellulitis:  Continue with wound healing recommendations.


Stage III pressure ulcer in the buttocks:  Continue wound healing 

recommendations.


GERD:  Continue with medication


BPH:  Continue with Flomax 0.4 mg daily.


Chronic pain:  Continue with Neurontin 100 mg at bedtime. 


Time Spent Managing Pts Care (In Minutes): 55

## 2020-05-21 NOTE — PN
Date of Progress Note:  05/20/2020



Subjective:  Patient is seen and examined at bedside.  He is doing very well at this time.  He denies
 any shortness of breath.  No overnight events were noted.



Physical Examination:

Vital Signs:  Have been reviewed and are stable overnight. 

General:  He appears in no acute distress. 

HEENT:  Atraumatic head. 

Lungs:  Auscultation of the lungs revealed bilateral equal air entry with diminished breath sounds at
 bases. 

Abdomen:  Soft and nontender. 

Extremities:  Showed bilateral lower extremity wounds.



Laboratory Data:  Showed a creatinine of 3.6, BUN of 84, and other electrolytes are stable.  BMP resu
lts have been reviewed in detail as well.  CBC results have been reviewed in detail as well.



Current Medications:  Pradaxa 75 mg b.i.d., vitamin D, Ensure, Lasix 40 mg IV daily, gabapentin, hydr
alazine, isosorbide, magnesium oxide, pantoprazole, sucralfate, tamsulosin, and Visine ophthalmalgic 
solution.



Impression:  

1.Acute on chronic renal failure worsening secondary to acute tubular necrosis from hemodynamic fluc
tuations from hypertension and hypotension and relative hypotension.  Patient is nonoliguric.  At thi
s time we will continue to monitor renal function.  Maybe he might benefit from gentle IV hydration.

2.Acute on chronic systolic heart failure.  Patient's volume status seems to be stable and improving
.  He is on once a day Lasix, which I will probably hold secondary to worsening renal function and ma
y be give some gentle hydration to see how he does.

3.Severe malnutrition with multiple wounds.  Patient is undergoing wound care.

4.Lymphedema, stable.

5.Atrial fibrillation, rate controlled at this time and is also on anticoagulation with Pradaxa.



Plan:  Overall the patient's renal function continues to worsen still from hemodynamic fluctuations. 
 At this time his volume status seems stable.  We will hold the Lasix and discussed with Dr. Castillo a
bout may be giving him some gentle IV hydration to see how he does.  Continue to monitor closely.  He
 is unable to be discharged at this time secondary to worsening renal status.  We will follow up clos
ely.





VV/MODL

DD:  05/21/2020 08:49:08Voice ID:  [id number]

DT:  05/21/2020 13:17:46Report ID:  [id number]

## 2020-05-22 VITALS — DIASTOLIC BLOOD PRESSURE: 58 MMHG | TEMPERATURE: 97.6 F | SYSTOLIC BLOOD PRESSURE: 126 MMHG

## 2020-05-22 RX ADMIN — ACETAMINOPHEN PRN MG: 500 TABLET, FILM COATED ORAL at 14:00

## 2020-05-22 RX ADMIN — SUCRALFATE SCH GM: 1 TABLET ORAL at 08:46

## 2020-05-22 RX ADMIN — ISOSORBIDE DINITRATE SCH MG: 20 TABLET ORAL at 08:53

## 2020-05-22 RX ADMIN — Medication SCH ML: at 08:47

## 2020-05-22 RX ADMIN — HYDRALAZINE HYDROCHLORIDE SCH MG: 25 TABLET, FILM COATED ORAL at 08:46

## 2020-05-22 RX ADMIN — ISOSORBIDE DINITRATE SCH MG: 20 TABLET ORAL at 14:01

## 2020-05-22 RX ADMIN — CHOLECALCIFEROL CAP 125 MCG (5000 UNIT) SCH UNIT: 125 CAP at 08:46

## 2020-05-22 RX ADMIN — ACETAMINOPHEN PRN MG: 500 TABLET, FILM COATED ORAL at 01:45

## 2020-05-22 RX ADMIN — Medication SCH APPL: at 08:47

## 2020-05-22 RX ADMIN — HYDRALAZINE HYDROCHLORIDE SCH MG: 25 TABLET, FILM COATED ORAL at 14:00

## 2020-05-22 RX ADMIN — MAGNESIUM OXIDE TAB 400 MG (241.3 MG ELEMENTAL MG) SCH MG: 400 (241.3 MG) TAB at 14:00

## 2020-05-22 RX ADMIN — Medication SCH PKT: at 08:47

## 2020-05-22 RX ADMIN — DABIGATRAN ETEXILATE MESYLATE SCH MG: 75 CAPSULE ORAL at 08:46

## 2020-05-22 RX ADMIN — SUCRALFATE SCH GM: 1 TABLET ORAL at 13:00

## 2020-05-22 RX ADMIN — PANTOPRAZOLE SODIUM SCH MG: 40 TABLET, DELAYED RELEASE ORAL at 08:46

## 2020-05-22 RX ADMIN — MAGNESIUM OXIDE TAB 400 MG (241.3 MG ELEMENTAL MG) SCH MG: 400 (241.3 MG) TAB at 08:46

## 2020-05-22 NOTE — P.DS
Admission Date: 05/13/20


Discharge Date: 05/22/20


Disposition: TRANSFER TO SNF - MEDICAL


Discharge Condition: GOOD


Reason for Admission: shortness of breath and lower extremity edema


Consultations: 





Nephrology-Dr. Ponce


Cardiology-Dr. Espinal





Procedures: 





ECHO: 


EF 47%


LEFT VENTRICULAR WALL MOTION:     MILD GLOBAL HYPOKINESIS.


DOPPLER/COLOR FLOW:     MILD AORTIC STENOSIS  1.7 CENTIMETER SQUARED. MILD 

TRICUSPID REGURGITATION  NORMAL RIGHT VENTRICULAR SYSTOLIC PRESSURE.


COMMENTS:      MILD AORTIC STENOSIS 1.7 CENTIMETERS SQUARED. MILD GLOBAL 

HYPOKINESIS  EJECTION FRACTION 44%. MITRAL ANNULAR CALCIFICATION. MILD TRICUSPID

REGURGITATION  NORMAL RIGHT VENTRICULAR SYSTOLIC PRESSURE.





Medical Problem List: 


Hypertensive emergency


Acute on chronic systolic CHF a EF 47% with mild aortic stenosis


Paroxysmal Atrial fibrillation on chronic anti coagulation therapy


Acute on chronic renal disease stage IV with cardiorenal syndrome


Leg wounds/cellulitis


Stage III pressure ulcer in the buttocks


GERD


BPH





Brief History of Present Illness: 





86-year-old  male with history of shortness of breath and lower 

extremity edema. Patient was admitted for acute on chronic systolic CHF.


Hospital Course: 





Patient presented with acute on chronic systolic CHF and mild aortic stenosis.  

Patient also with hypertensive emergency.  The patient was evaluated by 

nephrology and cardiology.  Ejection fraction around 46%.  Medications have been

adjusted during the course of his stay.  His condition has improved.  Currently 

the patient is on on a 1500 cc per day fluid restriction.  Patient previously on

Lasix 40 mg twice daily.  This has been held at this time due to acute on 

chronic renal disease stage IV with cardiorenal syndrome.  Patient has been 

evaluated for inpatient rehab.  This was denied by insurance.  Patient was 

evaluated for skilled placement and he has been approved.  Patient will go to 

skilled facility to continue rehabilitation.  At discharge patient will continue

with a 1500 cc per day fluid restriction and low-salt diet.  Recommend to 

monitor his weight closely.  If his weight increases by more than 5 lb will need

to consider restarting Lasix likely at 40 mg daily with potassium 

supplementation if okay with nephrology.  Recommend to recheck lab-BMP in 1-2 

weeks to monitor his progress.  Recommend to monitor renal function closely.  

Recommend no further use of nonsteroidal anti-inflammatories.  Recommend to 

renally those medication if new medication is required.  





As mentioned above patient presented with hypertensive emergency.  Medications 

have been adjusted by Nephrology and Cardiology.  Blood pressure now stable on 

hydralazine.  Patient previously on losartan.  At discharge for his 

hypertension, patient will continue with hydralazine 50 mg 1 pill 3 times a day.

 Recommend to maintain blood pressure less 150/80.  Further adjustment can be 

done by his PCP.





Patient with paroxysmally atrial fibrillation on chronic anti coalition therapy.

 This has remained stable.  At discharge patient will continue with Pradaxa 75 

mg 1 pill twice daily.  Recommend to follow up with cardiology to further 

monitor and address.  Patient also with underlying CAD.  At discharge he will 

continue with Isordil 20 mg 1 pill 3 times a day.





If patient with GERD.  Medications have been adjusted.  Patient will no longer 

take Prilosec.  At discharge he will continue with Protonix 40 mg daily and 

sucralfate 1 g 4 times a day.





Patient with stage III pressure ulcers to the buttocks and lower extremity 

chronic cellulitis.  At discharge he will continue with current wound care 

instructions including Medihoney and wrappings.





Patient with BPH.  At discharge he will continue with Flomax 0.8 mg daily.





Patient with chronic pain. At discharge he will continue with Neurontin 100 mg 3

times a day.


Vital Signs/Physical Exam: 














Temp Pulse Resp BP Pulse Ox


 


 97.7 F   66   18   100/60   97 


 


 05/22/20 08:00  05/22/20 08:00  05/22/20 08:00  05/22/20 08:00  05/22/20 08:00








General: Alert, In no apparent distress, Oriented x3, Cooperative


HEENT: Atraumatic


Neck: Supple


Respiratory: Clear to auscultation bilaterally, Normal air movement


Cardiovascular: Irregular heart rate/rhythm (AFib rate controlled)


Gastrointestinal: Normal bowel sounds, No tenderness, No masses, No rebound, No 

guarding


Integumentary: No warmth, No cyanosis


Neurological: Normal speech, Normal strength at 5/5 x4 extr, Normal tone, Normal

affect


Laboratory Data at Discharge: 














WBC  7.5 K/uL (4.3-10.9)  D 05/18/20  04:11    


 


Hgb  8.5 g/dL (13.6-17.9)  L  05/18/20  04:11    


 


Hct  26.7 % (39.6-49.0)  L  05/18/20  04:11    


 


Plt Count  255 K/uL (152-406)   05/18/20  04:11    


 


PT  13.0 SECONDS (9.5-12.5)  H  05/13/20  09:10    


 


INR  1.10   05/13/20  09:10    


 


Sodium  137 mmol/L (136-145)   05/20/20  05:14    


 


Potassium  3.8 mmol/L (3.5-5.1)   05/20/20  05:14    


 


BUN  84 mg/dL (7-18)  H  05/20/20  05:14    


 


Creatinine  3.61 mg/dL (0.55-1.3)  H  05/20/20  05:14    


 


Glucose  97 mg/dL ()   05/20/20  05:14    


 


Phosphorus  3.5 mg/dL (2.5-4.9)   05/21/20  05:24    


 


Magnesium  2.3 mg/dL (1.8-2.4)   05/22/20  05:04    


 


Total Bilirubin  0.5 mg/dL (0.2-1.0)   05/14/20  04:41    


 


AST  10 U/L (15-37)  L  05/14/20  04:41    


 


ALT  12 U/L (12-78)   05/14/20  04:41    


 


Alkaline Phosphatase  78 U/L ()   05/14/20  04:41    


 


Troponin I  < 0.02 ng/mL (0.0-0.045)   05/16/20  17:52    








Home Medications: 








Gabapentin [Neurontin*] 100 mg PO TID 05/13/20 


Magnesium Oxide [Mag 0X*] 400 mg PO TID 05/13/20 


Sennosides/Docusate Sodium [Senexon-S 50-8.6 mg Tablet] 1 each PO BEDTIME 

05/13/20 


Sucralfate [Carafate*] 1 gm PO QID 05/13/20 


Tamsulosin [Flomax*] 2 tab PO BEDTIME 05/13/20 


Cholecalciferol (Vitamin D3) [Vitamin D 5,000 IU Cap*] 5,000 unit PO DAILY #30 

cap 05/22/20 


Dabigatran Etexilate Mesylate [Pradaxa*] 75 mg PO BID #60 cap 05/22/20 


Hydralazine HCl 50 mg PO TID #90 tablet 05/22/20 


Isosorbide Dinit [Isordil*] 20 mg PO TID #90 tab 05/22/20 


Carl [Carl*] 1 pkt PO BID #60 powd.pack 05/22/20 


Medihoney [Medihoney Woundcare Gel*] 1 appl TOP DAILY #1 tube 05/22/20 


Pantoprazole [Protonix Tab*] 40 mg PO DAILY #30 tab 05/22/20 





New Medications: 


Hydralazine HCl 50 mg PO TID #90 tablet


Isosorbide Dinit [Isordil*] 20 mg PO TID #90 tab


Carl [Carl*] 1 pkt PO BID #60 powd.pack


Medihoney [Medihoney Woundcare Gel*] 1 appl TOP DAILY #1 tube


Dabigatran Etexilate Mesylate [Pradaxa*] 75 mg PO BID #60 cap


Pantoprazole [Protonix Tab*] 40 mg PO DAILY #30 tab


Cholecalciferol (Vitamin D3) [Vitamin D 5,000 IU Cap*] 5,000 unit PO DAILY #30 

cap


Patient Discharge Instructions: Okay to discharge to skilled facility.  Patient 

presented with acute on chronic systolic CHF and mild aortic stenosis.  Patient 

also with hypertensive emergency.  The patient was evaluated by nephrology and 

cardiology.  Ejection fraction around 46%.  Medications have been adjusted 

during the course of his stay.  His condition has improved.  Currently the 

patient is on on a 1500 cc per day fluid restriction.  Patient previously on 

Lasix 40 mg twice daily.  This has been held at this time due to acute on 

chronic renal disease stage IV with cardiorenal syndrome.  Patient has been 

evaluated for inpatient rehab.  This was denied by insurance.  Patient was 

evaluated for skilled placement and he has been approved.  Patient will go to 

skilled facility to continue rehabilitation.  At discharge patient will continue

with a 1500 cc per day fluid restriction and low-salt diet.  Recommend to 

monitor his weight closely.  If his weight increases by more than 5 lb will need

to consider restarting Lasix likely at 40 mg daily with potassium 

supplementation if okay with nephrology.  Recommend to recheck lab-BMP in 1-2 

weeks to monitor his progress.  Recommend to monitor renal function closely.  

Recommend no further use of nonsteroidal anti-inflammatories.  Recommend to 

renally those medication if new medication is required.  As mentioned above 

patient presented with hypertensive emergency.  Medications have been adjusted 

by Nephrology and Cardiology.  Blood pressure now stable on hydralazine.  

Patient previously on losartan.  At discharge for his hypertension, patient will

continue with hydralazine 50 mg 1 pill 3 times a day.  Recommend to maintain 

blood pressure less 150/80.  Further adjustment can be done by his PCP.  Patient

with paroxysmally atrial fibrillation on chronic anti coalition therapy.  This 

has remained stable.  At discharge patient will continue with Pradaxa 75 mg 1 

pill twice daily.  Recommend to follow up with cardiology to further monitor and

address.  Patient also with underlying CAD.  At discharge he will continue with 

Isordil 20 mg 1 pill 3 times a day.  If patient with GERD.  Medications have 

been adjusted.  Patient will no longer take Prilosec.  At discharge he will 

continue with Protonix 40 mg daily and sucralfate 1 g 4 times a day.  Patient 

with stage III pressure ulcers to the buttocks and lower extremity chronic 

cellulitis.  At discharge he will continue with current wound care instructions 

including Medihoney and wrappings.  Patient with BPH.  At discharge he will 

continue with Flomax 0.8 mg daily.  Patient with chronic pain. At discharge he 

will continue with Neurontin 100 mg 3 times a day.


Diet: AHA


Activity: Ad jenny


Time spent managing pt's care (in minutes): 55

## 2020-05-22 NOTE — PN
Date of Progress Note:  05/22/2020



Subjective:  Patient is seen and examined.  He is ready to be discharged.



Objective:  Vital Signs:  Have been reviewed and are stable. 

General Examination:  He appears in no acute distress. 

Lungs:  Clear to auscultation.  

Abdomen:  Soft. 

Extremities:  With some anasarca was noted.



Current Medications:  Have been reviewed.



Laboratory Data:  No new laboratory datahas been obtained.



Impression:  

1.Acute on chronic renal insufficiency secondary to acute tubular necrosis, currently with improving
 renal function.

2.Chronic congestive heart failure, currently with stable overall volume status.

3.Severe debility and weakness.

4.Hypertensive emergency.  Currently with improving blood pressure, more on the hypotensive side.



Plan:  Overall, patient is doing much better.  Will need to follow up on renal function as outpatient
.  He is okay to be discharged from Nephrology standpoint and his medications have been adjusted.





VV/MODL

DD:  05/22/2020 15:55:54Voice ID:  [id number]

DT:  05/22/2020 20:06:31Report ID:  [id number]